# Patient Record
Sex: MALE | Race: BLACK OR AFRICAN AMERICAN | Employment: FULL TIME | URBAN - METROPOLITAN AREA
[De-identification: names, ages, dates, MRNs, and addresses within clinical notes are randomized per-mention and may not be internally consistent; named-entity substitution may affect disease eponyms.]

---

## 2022-02-15 ENCOUNTER — HOSPITAL ENCOUNTER (INPATIENT)
Age: 67
LOS: 3 days | Discharge: HOME | DRG: 812 | End: 2022-02-20
Attending: EMERGENCY MEDICINE | Admitting: INTERNAL MEDICINE
Payer: MEDICARE

## 2022-02-15 DIAGNOSIS — D64.9 SYMPTOMATIC ANEMIA: Primary | ICD-10-CM

## 2022-02-15 DIAGNOSIS — C61 PROSTATE CANCER METASTATIC TO BONE (HCC): ICD-10-CM

## 2022-02-15 DIAGNOSIS — R50.9 FEVER, UNSPECIFIED FEVER CAUSE: ICD-10-CM

## 2022-02-15 DIAGNOSIS — R55 NEAR SYNCOPE: ICD-10-CM

## 2022-02-15 DIAGNOSIS — I47.29 NSVT (NONSUSTAINED VENTRICULAR TACHYCARDIA): ICD-10-CM

## 2022-02-15 DIAGNOSIS — R19.5 LOOSE STOOLS: ICD-10-CM

## 2022-02-15 DIAGNOSIS — C79.51 PROSTATE CANCER METASTATIC TO BONE (HCC): ICD-10-CM

## 2022-02-15 PROBLEM — R11.0 NAUSEA: Status: ACTIVE | Noted: 2022-02-15

## 2022-02-15 PROBLEM — R42 DIZZINESS: Status: ACTIVE | Noted: 2022-02-15

## 2022-02-15 PROBLEM — E87.6 HYPOKALEMIA: Status: ACTIVE | Noted: 2022-02-15

## 2022-02-15 PROBLEM — R63.0 ANOREXIA: Status: ACTIVE | Noted: 2022-02-15

## 2022-02-15 PROBLEM — I95.9 HYPOTENSION: Status: ACTIVE | Noted: 2022-02-15

## 2022-02-15 PROBLEM — E88.09 HYPOALBUMINEMIA: Status: ACTIVE | Noted: 2022-02-15

## 2022-02-15 PROBLEM — R53.1 WEAKNESS: Status: ACTIVE | Noted: 2022-02-15

## 2022-02-15 LAB
ALBUMIN SERPL-MCNC: 2.8 G/DL (ref 3.5–5)
ALBUMIN/GLOB SERPL: 0.7 {RATIO} (ref 1.1–2.2)
ALP SERPL-CCNC: 149 U/L (ref 45–117)
ALT SERPL-CCNC: 27 U/L (ref 12–78)
ANION GAP SERPL CALC-SCNC: 8 MMOL/L (ref 5–15)
AST SERPL-CCNC: 20 U/L (ref 15–37)
BASOPHILS # BLD: 0 K/UL (ref 0–0.1)
BASOPHILS NFR BLD: 0 % (ref 0–1)
BILIRUB SERPL-MCNC: 1.3 MG/DL (ref 0.2–1)
BNP SERPL-MCNC: 3805 PG/ML
BUN SERPL-MCNC: 15 MG/DL (ref 6–20)
BUN/CREAT SERPL: 14 (ref 12–20)
CALCIUM SERPL-MCNC: 6.6 MG/DL (ref 8.5–10.1)
CHLORIDE SERPL-SCNC: 111 MMOL/L (ref 97–108)
CO2 SERPL-SCNC: 22 MMOL/L (ref 21–32)
COMMENT, HOLDF: NORMAL
CREAT SERPL-MCNC: 1.1 MG/DL (ref 0.7–1.3)
DIFFERENTIAL METHOD BLD: ABNORMAL
EOSINOPHIL # BLD: 0 K/UL (ref 0–0.4)
EOSINOPHIL NFR BLD: 0 % (ref 0–7)
ERYTHROCYTE [DISTWIDTH] IN BLOOD BY AUTOMATED COUNT: 21.1 % (ref 11.5–14.5)
GLOBULIN SER CALC-MCNC: 3.9 G/DL (ref 2–4)
GLUCOSE SERPL-MCNC: 174 MG/DL (ref 65–100)
HCT VFR BLD AUTO: 20.8 % (ref 36.6–50.3)
HGB BLD-MCNC: 6.4 G/DL (ref 12.1–17)
IMM GRANULOCYTES # BLD AUTO: 0 K/UL
IMM GRANULOCYTES NFR BLD AUTO: 0 %
LYMPHOCYTES # BLD: 0.7 K/UL (ref 0.8–3.5)
LYMPHOCYTES NFR BLD: 9 % (ref 12–49)
MCH RBC QN AUTO: 28.6 PG (ref 26–34)
MCHC RBC AUTO-ENTMCNC: 30.8 G/DL (ref 30–36.5)
MCV RBC AUTO: 92.9 FL (ref 80–99)
METAMYELOCYTES NFR BLD MANUAL: 7 %
MONOCYTES # BLD: 0.7 K/UL (ref 0–1)
MONOCYTES NFR BLD: 9 % (ref 5–13)
MYELOCYTES NFR BLD MANUAL: 4 %
NEUTS BAND NFR BLD MANUAL: 9 % (ref 0–6)
NEUTS SEG # BLD: 5.6 K/UL (ref 1.8–8)
NEUTS SEG NFR BLD: 62 % (ref 32–75)
NRBC # BLD: 0.69 K/UL (ref 0–0.01)
NRBC BLD-RTO: 8.8 PER 100 WBC
PLATELET # BLD AUTO: 122 K/UL (ref 150–400)
PMV BLD AUTO: 10 FL (ref 8.9–12.9)
POTASSIUM SERPL-SCNC: 3 MMOL/L (ref 3.5–5.1)
PROT SERPL-MCNC: 6.7 G/DL (ref 6.4–8.2)
RBC # BLD AUTO: 2.24 M/UL (ref 4.1–5.7)
RBC MORPH BLD: ABNORMAL
RBC MORPH BLD: ABNORMAL
SAMPLES BEING HELD,HOLD: NORMAL
SODIUM SERPL-SCNC: 141 MMOL/L (ref 136–145)
TROPONIN-HIGH SENSITIVITY: 23 NG/L (ref 0–76)
WBC # BLD AUTO: 7.9 K/UL (ref 4.1–11.1)

## 2022-02-15 PROCEDURE — 86900 BLOOD TYPING SEROLOGIC ABO: CPT

## 2022-02-15 PROCEDURE — 99285 EMERGENCY DEPT VISIT HI MDM: CPT

## 2022-02-15 PROCEDURE — 84484 ASSAY OF TROPONIN QUANT: CPT

## 2022-02-15 PROCEDURE — 83735 ASSAY OF MAGNESIUM: CPT

## 2022-02-15 PROCEDURE — 74011250636 HC RX REV CODE- 250/636: Performed by: NURSE PRACTITIONER

## 2022-02-15 PROCEDURE — 85025 COMPLETE CBC W/AUTO DIFF WBC: CPT

## 2022-02-15 PROCEDURE — 80053 COMPREHEN METABOLIC PANEL: CPT

## 2022-02-15 PROCEDURE — 93005 ELECTROCARDIOGRAM TRACING: CPT

## 2022-02-15 PROCEDURE — 83880 ASSAY OF NATRIURETIC PEPTIDE: CPT

## 2022-02-15 PROCEDURE — G0378 HOSPITAL OBSERVATION PER HR: HCPCS

## 2022-02-15 PROCEDURE — 86923 COMPATIBILITY TEST ELECTRIC: CPT

## 2022-02-15 PROCEDURE — 36415 COLL VENOUS BLD VENIPUNCTURE: CPT

## 2022-02-15 RX ORDER — POTASSIUM CHLORIDE AND SODIUM CHLORIDE 900; 300 MG/100ML; MG/100ML
INJECTION, SOLUTION INTRAVENOUS CONTINUOUS
Status: DISCONTINUED | OUTPATIENT
Start: 2022-02-15 | End: 2022-02-18

## 2022-02-15 RX ORDER — CARVEDILOL 12.5 MG/1
25 TABLET ORAL 2 TIMES DAILY WITH MEALS
Status: DISCONTINUED | OUTPATIENT
Start: 2022-02-16 | End: 2022-02-16

## 2022-02-15 RX ORDER — SODIUM CHLORIDE 9 MG/ML
250 INJECTION, SOLUTION INTRAVENOUS AS NEEDED
Status: DISCONTINUED | OUTPATIENT
Start: 2022-02-15 | End: 2022-02-20 | Stop reason: HOSPADM

## 2022-02-15 RX ORDER — ONDANSETRON 2 MG/ML
4 INJECTION INTRAMUSCULAR; INTRAVENOUS
Status: DISCONTINUED | OUTPATIENT
Start: 2022-02-15 | End: 2022-02-20 | Stop reason: HOSPADM

## 2022-02-15 RX ORDER — ACETAMINOPHEN 325 MG/1
650 TABLET ORAL
Status: DISCONTINUED | OUTPATIENT
Start: 2022-02-15 | End: 2022-02-20 | Stop reason: HOSPADM

## 2022-02-15 RX ORDER — ONDANSETRON 4 MG/1
4 TABLET, ORALLY DISINTEGRATING ORAL
Status: DISCONTINUED | OUTPATIENT
Start: 2022-02-15 | End: 2022-02-20 | Stop reason: HOSPADM

## 2022-02-15 RX ORDER — POTASSIUM CHLORIDE 750 MG/1
40 TABLET, FILM COATED, EXTENDED RELEASE ORAL
Status: COMPLETED | OUTPATIENT
Start: 2022-02-15 | End: 2022-02-16

## 2022-02-15 RX ORDER — POLYETHYLENE GLYCOL 3350 17 G/17G
17 POWDER, FOR SOLUTION ORAL DAILY PRN
Status: DISCONTINUED | OUTPATIENT
Start: 2022-02-15 | End: 2022-02-20 | Stop reason: HOSPADM

## 2022-02-15 RX ORDER — ACETAMINOPHEN 650 MG/1
650 SUPPOSITORY RECTAL
Status: DISCONTINUED | OUTPATIENT
Start: 2022-02-15 | End: 2022-02-20 | Stop reason: HOSPADM

## 2022-02-15 RX ADMIN — SODIUM CHLORIDE 1000 ML: 9 INJECTION, SOLUTION INTRAVENOUS at 22:07

## 2022-02-16 ENCOUNTER — APPOINTMENT (OUTPATIENT)
Dept: CT IMAGING | Age: 67
DRG: 812 | End: 2022-02-16
Attending: INTERNAL MEDICINE
Payer: MEDICARE

## 2022-02-16 ENCOUNTER — APPOINTMENT (OUTPATIENT)
Dept: GENERAL RADIOLOGY | Age: 67
DRG: 812 | End: 2022-02-16
Attending: PHYSICIAN ASSISTANT
Payer: MEDICARE

## 2022-02-16 ENCOUNTER — APPOINTMENT (OUTPATIENT)
Dept: VASCULAR SURGERY | Age: 67
DRG: 812 | End: 2022-02-16
Attending: INTERNAL MEDICINE
Payer: MEDICARE

## 2022-02-16 PROBLEM — D64.81 ANEMIA DUE TO ANTINEOPLASTIC CHEMOTHERAPY: Status: ACTIVE | Noted: 2022-02-15

## 2022-02-16 PROBLEM — T45.1X5A CHEMOTHERAPY-INDUCED NAUSEA: Status: ACTIVE | Noted: 2022-02-15

## 2022-02-16 PROBLEM — R19.5 LOOSE STOOLS: Status: ACTIVE | Noted: 2022-02-16

## 2022-02-16 PROBLEM — T45.1X5A ANEMIA DUE TO ANTINEOPLASTIC CHEMOTHERAPY: Status: ACTIVE | Noted: 2022-02-15

## 2022-02-16 PROBLEM — E53.8 FOLATE DEFICIENCY: Status: ACTIVE | Noted: 2022-02-16

## 2022-02-16 LAB
ALBUMIN SERPL-MCNC: 2.4 G/DL (ref 3.5–5)
ALBUMIN/GLOB SERPL: 0.9 {RATIO} (ref 1.1–2.2)
ALP SERPL-CCNC: 123 U/L (ref 45–117)
ALT SERPL-CCNC: 22 U/L (ref 12–78)
ANION GAP SERPL CALC-SCNC: 9 MMOL/L (ref 5–15)
APPEARANCE UR: ABNORMAL
APPEARANCE UR: ABNORMAL
AST SERPL-CCNC: 17 U/L (ref 15–37)
ATRIAL RATE: 89 BPM
B PERT DNA SPEC QL NAA+PROBE: NOT DETECTED
BACTERIA URNS QL MICRO: NEGATIVE /HPF
BACTERIA URNS QL MICRO: NEGATIVE /HPF
BILIRUB SERPL-MCNC: 1.1 MG/DL (ref 0.2–1)
BILIRUB UR QL CFM: NEGATIVE
BILIRUB UR QL CFM: POSITIVE
BORDETELLA PARAPERTUSSIS PCR, BORPAR: NOT DETECTED
BUN SERPL-MCNC: 14 MG/DL (ref 6–20)
BUN/CREAT SERPL: 18 (ref 12–20)
C PNEUM DNA SPEC QL NAA+PROBE: NOT DETECTED
CALCIUM SERPL-MCNC: 5.5 MG/DL (ref 8.5–10.1)
CALCULATED P AXIS, ECG09: 33 DEGREES
CALCULATED R AXIS, ECG10: 4 DEGREES
CALCULATED T AXIS, ECG11: 9 DEGREES
CHLORIDE SERPL-SCNC: 117 MMOL/L (ref 97–108)
CO2 SERPL-SCNC: 19 MMOL/L (ref 21–32)
COLOR UR: ABNORMAL
COLOR UR: ABNORMAL
COMMENT, HOLDF: NORMAL
CREAT SERPL-MCNC: 0.79 MG/DL (ref 0.7–1.3)
CRP SERPL-MCNC: 13.6 MG/DL (ref 0–0.6)
DIAGNOSIS, 93000: NORMAL
EPITH CASTS URNS QL MICRO: ABNORMAL /LPF
EPITH CASTS URNS QL MICRO: ABNORMAL /LPF
ERYTHROCYTE [DISTWIDTH] IN BLOOD BY AUTOMATED COUNT: 21.1 % (ref 11.5–14.5)
FERRITIN SERPL-MCNC: 1226 NG/ML (ref 26–388)
FLUAV H1 2009 PAND RNA SPEC QL NAA+PROBE: NOT DETECTED
FLUAV H1 RNA SPEC QL NAA+PROBE: NOT DETECTED
FLUAV H3 RNA SPEC QL NAA+PROBE: NOT DETECTED
FLUAV SUBTYP SPEC NAA+PROBE: NOT DETECTED
FLUBV RNA SPEC QL NAA+PROBE: NOT DETECTED
FOLATE SERPL-MCNC: 4.5 NG/ML (ref 5–21)
GLOBULIN SER CALC-MCNC: 2.7 G/DL (ref 2–4)
GLUCOSE BLD STRIP.AUTO-MCNC: 115 MG/DL (ref 65–117)
GLUCOSE BLD STRIP.AUTO-MCNC: 126 MG/DL (ref 65–117)
GLUCOSE BLD STRIP.AUTO-MCNC: 161 MG/DL (ref 65–117)
GLUCOSE BLD STRIP.AUTO-MCNC: 193 MG/DL (ref 65–117)
GLUCOSE SERPL-MCNC: 148 MG/DL (ref 65–100)
GLUCOSE UR STRIP.AUTO-MCNC: NEGATIVE MG/DL
GLUCOSE UR STRIP.AUTO-MCNC: NEGATIVE MG/DL
GRAN CASTS URNS QL MICRO: ABNORMAL /LPF
GRAN CASTS URNS QL MICRO: ABNORMAL /LPF
HADV DNA SPEC QL NAA+PROBE: NOT DETECTED
HAPTOGLOB SERPL-MCNC: 149 MG/DL (ref 30–200)
HCOV 229E RNA SPEC QL NAA+PROBE: NOT DETECTED
HCOV HKU1 RNA SPEC QL NAA+PROBE: NOT DETECTED
HCOV NL63 RNA SPEC QL NAA+PROBE: NOT DETECTED
HCOV OC43 RNA SPEC QL NAA+PROBE: NOT DETECTED
HCT VFR BLD AUTO: 16.9 % (ref 36.6–50.3)
HCT VFR BLD AUTO: 23.5 % (ref 36.6–50.3)
HCT VFR BLD AUTO: 24.9 % (ref 36.6–50.3)
HEMOCCULT STL QL: NEGATIVE
HGB BLD-MCNC: 5.1 G/DL (ref 12.1–17)
HGB BLD-MCNC: 7.4 G/DL (ref 12.1–17)
HGB BLD-MCNC: 7.7 G/DL (ref 12.1–17)
HGB UR QL STRIP: NEGATIVE
HGB UR QL STRIP: NEGATIVE
HISTORY CHECKED?,CKHIST: NORMAL
HISTORY CHECKED?,CKHIST: NORMAL
HMPV RNA SPEC QL NAA+PROBE: NOT DETECTED
HPIV1 RNA SPEC QL NAA+PROBE: NOT DETECTED
HPIV2 RNA SPEC QL NAA+PROBE: NOT DETECTED
HPIV3 RNA SPEC QL NAA+PROBE: NOT DETECTED
HPIV4 RNA SPEC QL NAA+PROBE: NOT DETECTED
HYALINE CASTS URNS QL MICRO: ABNORMAL /LPF (ref 0–5)
HYALINE CASTS URNS QL MICRO: ABNORMAL /LPF (ref 0–5)
IRON SATN MFR SERPL: 26 % (ref 20–50)
IRON SERPL-MCNC: 48 UG/DL (ref 35–150)
KETONES UR QL STRIP.AUTO: ABNORMAL MG/DL
KETONES UR QL STRIP.AUTO: NEGATIVE MG/DL
LACTATE SERPL-SCNC: 1.9 MMOL/L (ref 0.4–2)
LACTATE SERPL-SCNC: 2.5 MMOL/L (ref 0.4–2)
LDH SERPL L TO P-CCNC: 577 U/L (ref 85–241)
LEUKOCYTE ESTERASE UR QL STRIP.AUTO: ABNORMAL
LEUKOCYTE ESTERASE UR QL STRIP.AUTO: ABNORMAL
M PNEUMO DNA SPEC QL NAA+PROBE: NOT DETECTED
MAGNESIUM SERPL-MCNC: 1.7 MG/DL (ref 1.6–2.4)
MAGNESIUM SERPL-MCNC: 2 MG/DL (ref 1.6–2.4)
MCH RBC QN AUTO: 28.2 PG (ref 26–34)
MCHC RBC AUTO-ENTMCNC: 30.2 G/DL (ref 30–36.5)
MCV RBC AUTO: 93.4 FL (ref 80–99)
MUCOUS THREADS URNS QL MICRO: ABNORMAL /LPF
NITRITE UR QL STRIP.AUTO: NEGATIVE
NITRITE UR QL STRIP.AUTO: NEGATIVE
NRBC # BLD: 0.5 K/UL (ref 0–0.01)
NRBC BLD-RTO: 8.1 PER 100 WBC
P-R INTERVAL, ECG05: 128 MS
PH UR STRIP: 5.5 [PH] (ref 5–8)
PH UR STRIP: 5.5 [PH] (ref 5–8)
PLATELET # BLD AUTO: 104 K/UL (ref 150–400)
PMV BLD AUTO: 11.3 FL (ref 8.9–12.9)
POTASSIUM SERPL-SCNC: 2.8 MMOL/L (ref 3.5–5.1)
POTASSIUM SERPL-SCNC: 3.6 MMOL/L (ref 3.5–5.1)
PROCALCITONIN SERPL-MCNC: 0.59 NG/ML
PROT SERPL-MCNC: 5.1 G/DL (ref 6.4–8.2)
PROT UR STRIP-MCNC: 100 MG/DL
PROT UR STRIP-MCNC: 100 MG/DL
Q-T INTERVAL, ECG07: 428 MS
QRS DURATION, ECG06: 88 MS
QTC CALCULATION (BEZET), ECG08: 520 MS
RBC # BLD AUTO: 1.81 M/UL (ref 4.1–5.7)
RBC #/AREA URNS HPF: ABNORMAL /HPF (ref 0–5)
RBC #/AREA URNS HPF: ABNORMAL /HPF (ref 0–5)
RETICS # AUTO: 0.05 M/UL (ref 0.03–0.1)
RETICS/RBC NFR AUTO: 3 % (ref 0.7–2.1)
RSV RNA SPEC QL NAA+PROBE: NOT DETECTED
RV+EV RNA SPEC QL NAA+PROBE: NOT DETECTED
SAMPLES BEING HELD,HOLD: NORMAL
SARS-COV-2 PCR, COVPCR: NOT DETECTED
SERVICE CMNT-IMP: ABNORMAL
SERVICE CMNT-IMP: NORMAL
SODIUM SERPL-SCNC: 145 MMOL/L (ref 136–145)
SP GR UR REFRACTOMETRY: 1.02 (ref 1–1.03)
SP GR UR REFRACTOMETRY: 1.02 (ref 1–1.03)
TIBC SERPL-MCNC: 185 UG/DL (ref 250–450)
UA: UC IF INDICATED,UAUC: ABNORMAL
UR CULT HOLD, URHOLD: NORMAL
URATE SERPL-MCNC: 13.1 MG/DL (ref 3.5–7.2)
UROBILINOGEN UR QL STRIP.AUTO: 1 EU/DL (ref 0.2–1)
UROBILINOGEN UR QL STRIP.AUTO: 2 EU/DL (ref 0.2–1)
VENTRICULAR RATE, ECG03: 89 BPM
VIT B12 SERPL-MCNC: 1883 PG/ML (ref 193–986)
WBC # BLD AUTO: 6.2 K/UL (ref 4.1–11.1)
WBC CASTS URNS QL MICRO: ABNORMAL /LPF
WBC URNS QL MICRO: ABNORMAL /HPF (ref 0–4)
WBC URNS QL MICRO: ABNORMAL /HPF (ref 0–4)

## 2022-02-16 PROCEDURE — 74011250637 HC RX REV CODE- 250/637: Performed by: INTERNAL MEDICINE

## 2022-02-16 PROCEDURE — 96376 TX/PRO/DX INJ SAME DRUG ADON: CPT

## 2022-02-16 PROCEDURE — 74011250637 HC RX REV CODE- 250/637: Performed by: NURSE PRACTITIONER

## 2022-02-16 PROCEDURE — 74011250636 HC RX REV CODE- 250/636: Performed by: INTERNAL MEDICINE

## 2022-02-16 PROCEDURE — 85018 HEMOGLOBIN: CPT

## 2022-02-16 PROCEDURE — 87177 OVA AND PARASITES SMEARS: CPT

## 2022-02-16 PROCEDURE — 82272 OCCULT BLD FECES 1-3 TESTS: CPT

## 2022-02-16 PROCEDURE — 84550 ASSAY OF BLOOD/URIC ACID: CPT

## 2022-02-16 PROCEDURE — 97161 PT EVAL LOW COMPLEX 20 MIN: CPT

## 2022-02-16 PROCEDURE — 80053 COMPREHEN METABOLIC PANEL: CPT

## 2022-02-16 PROCEDURE — 82607 VITAMIN B-12: CPT

## 2022-02-16 PROCEDURE — 96366 THER/PROPH/DIAG IV INF ADDON: CPT

## 2022-02-16 PROCEDURE — 2709999900 HC NON-CHARGEABLE SUPPLY

## 2022-02-16 PROCEDURE — 82728 ASSAY OF FERRITIN: CPT

## 2022-02-16 PROCEDURE — P9016 RBC LEUKOCYTES REDUCED: HCPCS

## 2022-02-16 PROCEDURE — 87040 BLOOD CULTURE FOR BACTERIA: CPT

## 2022-02-16 PROCEDURE — 83540 ASSAY OF IRON: CPT

## 2022-02-16 PROCEDURE — 84132 ASSAY OF SERUM POTASSIUM: CPT

## 2022-02-16 PROCEDURE — G0378 HOSPITAL OBSERVATION PER HR: HCPCS

## 2022-02-16 PROCEDURE — 87324 CLOSTRIDIUM AG IA: CPT

## 2022-02-16 PROCEDURE — 97535 SELF CARE MNGMENT TRAINING: CPT

## 2022-02-16 PROCEDURE — 84145 PROCALCITONIN (PCT): CPT

## 2022-02-16 PROCEDURE — 82746 ASSAY OF FOLIC ACID SERUM: CPT

## 2022-02-16 PROCEDURE — 83615 LACTATE (LD) (LDH) ENZYME: CPT

## 2022-02-16 PROCEDURE — 73630 X-RAY EXAM OF FOOT: CPT

## 2022-02-16 PROCEDURE — 74011000250 HC RX REV CODE- 250: Performed by: INTERNAL MEDICINE

## 2022-02-16 PROCEDURE — 36430 TRANSFUSION BLD/BLD COMPNT: CPT

## 2022-02-16 PROCEDURE — 96375 TX/PRO/DX INJ NEW DRUG ADDON: CPT

## 2022-02-16 PROCEDURE — 82962 GLUCOSE BLOOD TEST: CPT

## 2022-02-16 PROCEDURE — 83010 ASSAY OF HAPTOGLOBIN QUANT: CPT

## 2022-02-16 PROCEDURE — 0202U NFCT DS 22 TRGT SARS-COV-2: CPT

## 2022-02-16 PROCEDURE — 85027 COMPLETE CBC AUTOMATED: CPT

## 2022-02-16 PROCEDURE — 83735 ASSAY OF MAGNESIUM: CPT

## 2022-02-16 PROCEDURE — 51798 US URINE CAPACITY MEASURE: CPT

## 2022-02-16 PROCEDURE — 97530 THERAPEUTIC ACTIVITIES: CPT

## 2022-02-16 PROCEDURE — 81001 URINALYSIS AUTO W/SCOPE: CPT

## 2022-02-16 PROCEDURE — 83605 ASSAY OF LACTIC ACID: CPT

## 2022-02-16 PROCEDURE — 74176 CT ABD & PELVIS W/O CONTRAST: CPT

## 2022-02-16 PROCEDURE — 85045 AUTOMATED RETICULOCYTE COUNT: CPT

## 2022-02-16 PROCEDURE — 36415 COLL VENOUS BLD VENIPUNCTURE: CPT

## 2022-02-16 PROCEDURE — 96365 THER/PROPH/DIAG IV INF INIT: CPT

## 2022-02-16 PROCEDURE — 97116 GAIT TRAINING THERAPY: CPT

## 2022-02-16 PROCEDURE — 93970 EXTREMITY STUDY: CPT

## 2022-02-16 PROCEDURE — 87506 IADNA-DNA/RNA PROBE TQ 6-11: CPT

## 2022-02-16 PROCEDURE — 96368 THER/DIAG CONCURRENT INF: CPT

## 2022-02-16 PROCEDURE — 97165 OT EVAL LOW COMPLEX 30 MIN: CPT

## 2022-02-16 PROCEDURE — 77030027138 HC INCENT SPIROMETER -A

## 2022-02-16 PROCEDURE — 86140 C-REACTIVE PROTEIN: CPT

## 2022-02-16 PROCEDURE — 99223 1ST HOSP IP/OBS HIGH 75: CPT | Performed by: INTERNAL MEDICINE

## 2022-02-16 RX ORDER — MEGESTROL ACETATE 40 MG/ML
400 SUSPENSION ORAL DAILY
COMMUNITY

## 2022-02-16 RX ORDER — MAGNESIUM SULFATE 100 %
4 CRYSTALS MISCELLANEOUS AS NEEDED
Status: DISCONTINUED | OUTPATIENT
Start: 2022-02-16 | End: 2022-02-20 | Stop reason: HOSPADM

## 2022-02-16 RX ORDER — PREDNISONE 1 MG/1
5 TABLET ORAL DAILY
COMMUNITY

## 2022-02-16 RX ORDER — INSULIN LISPRO 100 [IU]/ML
INJECTION, SOLUTION INTRAVENOUS; SUBCUTANEOUS
Status: DISCONTINUED | OUTPATIENT
Start: 2022-02-16 | End: 2022-02-20 | Stop reason: HOSPADM

## 2022-02-16 RX ORDER — IBUPROFEN 600 MG/1
600 TABLET ORAL 2 TIMES DAILY
COMMUNITY

## 2022-02-16 RX ORDER — MEGESTROL ACETATE 40 MG/ML
400 SUSPENSION ORAL DAILY
Status: DISCONTINUED | OUTPATIENT
Start: 2022-02-17 | End: 2022-02-20 | Stop reason: HOSPADM

## 2022-02-16 RX ORDER — LOPERAMIDE HYDROCHLORIDE 2 MG/1
2 CAPSULE ORAL
Status: DISCONTINUED | OUTPATIENT
Start: 2022-02-16 | End: 2022-02-20 | Stop reason: HOSPADM

## 2022-02-16 RX ORDER — PREDNISONE 5 MG/1
5 TABLET ORAL DAILY
Status: DISCONTINUED | OUTPATIENT
Start: 2022-02-17 | End: 2022-02-18

## 2022-02-16 RX ORDER — ONDANSETRON 4 MG/1
4 TABLET, FILM COATED ORAL
COMMUNITY

## 2022-02-16 RX ORDER — METRONIDAZOLE 500 MG/100ML
500 INJECTION, SOLUTION INTRAVENOUS EVERY 12 HOURS
Status: DISCONTINUED | OUTPATIENT
Start: 2022-02-16 | End: 2022-02-17

## 2022-02-16 RX ORDER — POTASSIUM CHLORIDE 750 MG/1
40 TABLET, FILM COATED, EXTENDED RELEASE ORAL
Status: COMPLETED | OUTPATIENT
Start: 2022-02-16 | End: 2022-02-16

## 2022-02-16 RX ORDER — SODIUM CHLORIDE 9 MG/ML
250 INJECTION, SOLUTION INTRAVENOUS AS NEEDED
Status: DISCONTINUED | OUTPATIENT
Start: 2022-02-16 | End: 2022-02-20 | Stop reason: HOSPADM

## 2022-02-16 RX ORDER — MULTIVITAMIN
1 TABLET ORAL DAILY
COMMUNITY

## 2022-02-16 RX ORDER — DEXTROSE 50 % IN WATER (D50W) INTRAVENOUS SYRINGE
12.5-25 AS NEEDED
Status: DISCONTINUED | OUTPATIENT
Start: 2022-02-16 | End: 2022-02-20 | Stop reason: HOSPADM

## 2022-02-16 RX ORDER — FOLIC ACID 1 MG/1
1 TABLET ORAL DAILY
Status: DISCONTINUED | OUTPATIENT
Start: 2022-02-16 | End: 2022-02-20 | Stop reason: HOSPADM

## 2022-02-16 RX ORDER — MAGNESIUM SULFATE 1 G/100ML
1 INJECTION INTRAVENOUS
Status: DISPENSED | OUTPATIENT
Start: 2022-02-16 | End: 2022-02-16

## 2022-02-16 RX ORDER — CALCIUM GLUCONATE 20 MG/ML
1 INJECTION, SOLUTION INTRAVENOUS ONCE
Status: COMPLETED | OUTPATIENT
Start: 2022-02-16 | End: 2022-02-16

## 2022-02-16 RX ADMIN — POTASSIUM CHLORIDE AND SODIUM CHLORIDE 1000 ML: 900; 300 INJECTION, SOLUTION INTRAVENOUS at 03:55

## 2022-02-16 RX ADMIN — METRONIDAZOLE 500 MG: 500 INJECTION, SOLUTION INTRAVENOUS at 18:39

## 2022-02-16 RX ADMIN — CEFTRIAXONE 1 G: 1 INJECTION, POWDER, FOR SOLUTION INTRAMUSCULAR; INTRAVENOUS at 18:39

## 2022-02-16 RX ADMIN — POTASSIUM CHLORIDE 40 MEQ: 750 TABLET, FILM COATED, EXTENDED RELEASE ORAL at 10:42

## 2022-02-16 RX ADMIN — POTASSIUM CHLORIDE 40 MEQ: 750 TABLET, EXTENDED RELEASE ORAL at 00:44

## 2022-02-16 RX ADMIN — CALCIUM GLUCONATE 1000 MG: 20 INJECTION, SOLUTION INTRAVENOUS at 01:46

## 2022-02-16 RX ADMIN — POTASSIUM CHLORIDE AND SODIUM CHLORIDE: 900; 300 INJECTION, SOLUTION INTRAVENOUS at 11:43

## 2022-02-16 RX ADMIN — MAGNESIUM SULFATE HEPTAHYDRATE 1 G: 1 INJECTION, SOLUTION INTRAVENOUS at 01:49

## 2022-02-16 RX ADMIN — ONDANSETRON 4 MG: 4 TABLET, ORALLY DISINTEGRATING ORAL at 21:40

## 2022-02-16 RX ADMIN — FOLIC ACID 1 MG: 1 TABLET ORAL at 15:08

## 2022-02-16 NOTE — PROGRESS NOTES
2/16/2022  11:59 AM  Medicare Outpatient Observation Notice (MOON)/ [place] Outpatient Observation Notice (Sloan Cantu) provided to patient/representative with verbal explanation of the notice. Time allotted for questions regarding the notice. Patient /representative provided a completed copy of the MOON/VOON notice. Copy placed on bedside chart.   Ty Donald, MARÍA ELENA

## 2022-02-16 NOTE — PROGRESS NOTES
Physical Therapy Note:  PT rders received and appreciated, chart reviewed, noted hgb of 5.1 this AM and pt now s/p 2 units of blood, awaiting new draw. Pt also with orders for LE dopplers to r/o DVT.  Will defer and follow up after completion of dopplers prior to initiation PT eval.   Alexander Chavez, PT

## 2022-02-16 NOTE — H&P
[facility]    Hospitalist Admission Note      NAME:  Som Ho   :   1955   MRN:  [id number]     PCP:  Sharyn Mayorga MD     Date/Time of service:  2/15/2022 11:30 PM          Subjective:     CHIEF COMPLAINT: dizzy     HISTORY OF PRESENT ILLNESS:     Mr. Marc Church is a 78 y.o.  male who presented to the Emergency Department complaining of dizziness. He just completed his 2nd chemo for worsening prostate cancer 2 weeks ago in [place], before [place] in [place]. ER finds anemia. We will admit him for observation. Past Medical History:   Diagnosis Date    GERD (gastroesophageal reflux disease)     HTN (hypertension)     Prostate cancer metastatic to bone ([address]     Urinary incontinence         No past surgical history on file. Social History     Tobacco Use    Smoking status: Not on file    Smokeless tobacco: Not on file   Substance Use Topics    Alcohol use: Not on file        No family history on file.    Family hx cannot be fully assessed, since the patient cannot provide information    No Known Allergies     Prior to Admission medications    Not on File       Review of Systems:  (bold if positive, if negative)    Gen:  Eyes:  ENT:  CVS:  Pulm:  GI:  :  MS:  Skin:  Psych:  Endo:  Hem:  Renal:  Neuro:        Objective:      VITALS:    Vital signs reviewed; most recent are:    Visit Vitals  /68 (BP Patient Position: Standing)   Pulse (!) 114   Temp 98.4 °F (36.9 °C)   Resp 24   Ht 5' 11\" (1.803 m)   Wt 86.2 kg (190 lb)   SpO2 100%   BMI 26.50 kg/m²     SpO2 Readings from Last 6 Encounters:   02/15/22 100%        No intake or output data in the 24 hours ending 02/15/22 2330     Exam:     Physical Exam:    Gen:  Well-developed, well-nourished, in no acute distress  HEENT:  Pink conjunctivae, PERRL, hearing intact to voice, moist mucous membranes  Neck:  Supple, without masses, thyroid non-tender  Resp:  No accessory muscle use, clear breath sounds without wheezes rales or rhonchi  Card:  No murmurs, tachycardic S1, S2 without thrills, bruits or peripheral edema  Abd:  Soft, non-tender, non-distended, normoactive bowel sounds are present, no mass  Lymph:  No cervical or inguinal adenopathy  Musc:  No cyanosis or clubbing  Skin:  No rashes or ulcers, skin turgor is good  Neuro:  Cranial nerves are grossly intact, general motor weakness, follows commands   Psych:  Good insight, oriented to person, place and time, alert     Labs:    Recent Labs     02/15/22  2200   WBC 7.9   HGB 6.4*   HCT 20.8*   *     Recent Labs     02/15/22  2200      K 3.0*   *   CO2 22   *   BUN 15   CREA 1.10   CA 6.6*   ALB 2.8*   TBILI 1.3*   ALT 27     No results found for: GLUCPOC  No results for input(s): PH, PCO2, PO2, HCO3, FIO2 in the last 72 hours. No results for input(s): INR, INREXT in the last 72 hours. All Micro Results     Procedure Component Value Units Date/Time    RESPIRATORY VIRUS PANEL W/COVID-19, PCR [[id number]]     Order Status: Sent Specimen: NASOPHARYNGEAL SWAB     URINE CULTURE HOLD SAMPLE [[id number]]     Order Status: Sent Specimen: Urine           I have reviewed previous records       Assessment and Plan:      Anemia / Thrombocytopenia - POA, presumed due to chemo. Orthostatic dizziness / Hx HTN (hypertension) - POA due to IVVD, anemia and overmedication. Hydrate and hold HCTZ, valsartan and norvasc. Continue coreg. Hypokalemia - Related to IVVD and anorexia. Replete and monitor. Anorexia / Nausea / GERD (gastroesophageal reflux disease) / Hypoalbuminemia - POA due to chemo  Check for infection, procalcitonin    Prostate cancer metastatic to bone - Followed by oncology in [address] [place] [address]    Urinary incontinence - Followed by urology    Diabetes - POA, usually on metformin. Until eating I will hold off SSI per protocol. Hold off home metformin. Check A1c.     Weakness - POA due to anemia, IVVD, chemo, cancer, deconditioning, etc. Fall precautions. PT POT eval    Telemetry reviewed:   normal sinus rhythm    Risk of deterioration: high      Total time spent with patient: 48 Minutes I personally reviewed chart, notes, data and current medications in the medical record. I have personally examined and treated the patient at bedside during this period.                  Care Plan discussed with: Patient, Nursing Staff and >50% of time spent in counseling and coordination of care    Discussed:  Care Plan       ___________________________________________________    Attending Physician: Adrian Holt MD

## 2022-02-16 NOTE — PROGRESS NOTES
Problem: Mobility Impaired (Adult and Pediatric)  Goal: *Acute Goals and Plan of Care (Insert Text)  Description: FUNCTIONAL STATUS PRIOR TO ADMISSION: Patient was modified independent using a single point cane for functional mobility. HOME SUPPORT PRIOR TO ADMISSION: The patient lived with single point cane but did not require assist.    Physical Therapy Goals  Initiated 2/16/2022  1. Patient will move from supine to sit and sit to supine , scoot up and down, and roll side to side in bed with independence within 7 day(s). 2.  Patient will transfer from bed to chair and chair to bed with modified independence using the least restrictive device within 7 day(s). 3.  Patient will perform sit to stand with modified independence within 7 day(s). 4.  Patient will ambulate with modified independence for 200 feet with the least restrictive device within 7 day(s). 5.  Patient will ascend/descend 4 stairs with  handrail(s) with modified independence within 7 day(s). Outcome: Progressing Towards Goal   PHYSICAL THERAPY EVALUATION  Patient: Itz Engel (1 y.o. male)  Date: 2/16/2022  Primary Diagnosis: Anemia [D64.9]        Precautions: fall       ASSESSMENT  Based on the objective data described below, the patient presents with difficulty with ambulation. Communicated with nurse DVT result came back negative cleared for therapy. Patient supine on bed when received daughter at bedside agreed with all goals set for the patient. Still having pain on the right foot for gout flare up. Rolled on the edge of bed CGA, supine to sit CGA, sit to stand CGA, ambulate with single point cane in the room and around the bed patient cane too short and noted patient unsteady holding on the IV pole during ambulation. Provided with rolling walker and patient able to ambulate much steadier and safer. Offered to be OOB to chair as tolerated patient declined just want to go back to bed and have a nap.  Assisted supine on bed and activated bed alarm notified nurse who agreed to monitor patient. Educate and instructed patient to continue active range of motion exercise on both legs while up on chair or on bed multiple times. Recommend patient to be up on the chair at least 3 times a day every meal times as tolerated. Communicated with nurse who agreed to monitor patient. Current Level of Function Impacting Discharge (mobility/balance): CGA with transfers and ambulation using a rolling walker     Functional Outcome Measure: The patient scored 55/100 on the barthel index outcome measure. Other factors to consider for discharge: fall     Patient will benefit from skilled therapy intervention to address the above noted impairments. PLAN :  Recommendations and Planned Interventions: bed mobility training, transfer training, gait training, therapeutic exercises, neuromuscular re-education, orthotic/prosthetic training, patient and family training/education, and therapeutic activities      Frequency/Duration: Patient will be followed by physical therapy:  5 times a week to address goals. Recommendation for discharge: (in order for the patient to meet his/her long term goals)  No skilled physical therapy/ follow up rehabilitation needs identified at this time. This discharge recommendation:  Has been made in collaboration with the attending provider and/or case management    IF patient discharges home will need the following DME: rolling walker and to be determined (TBD)         SUBJECTIVE:   Patient stated ok.     OBJECTIVE DATA SUMMARY:   HISTORY:    Past Medical History:   Diagnosis Date    DM (diabetes mellitus) ([facility]     GERD (gastroesophageal reflux disease)     HTN (hypertension)     Prostate cancer metastatic to bone [facility])     Urinary incontinence    No past surgical history on file.     Personal factors and/or comorbidities impacting plan of care:     Home Situation  Home Environment: Private residence  # Steps to Enter: 1 (one step down)  Rails to Enter: No  One/Two Story Residence: Two story  Living Alone: No  Support Systems: Spouse/Significant Other (pt livesd in pvt residece w/ spouse and 2 sons, at baseline pt is ambulatory, iADLS, drives)  Patient Expects to be Discharged to[de-identified] Home with outpatient services    EXAMINATION/PRESENTATION/DECISION MAKING:   Critical Behavior:  Neurologic State: Alert  Orientation Level: Oriented X4  Cognition: Appropriate decision making,Appropriate for age attention/concentration,Appropriate safety awareness,Follows commands  Safety/Judgement: Awareness of environment,Fall prevention,Good awareness of safety precautions,Home safety,Insight into deficits  Hearing: Auditory  Auditory Impairment: Hard of hearing, bilateral      Range Of Motion:  AROM: Generally decreased, functional                       Strength:    Strength: Generally decreased, functional                    Tone & Sensation:   Tone: Normal                              Coordination:  Coordination: Within functional limits  Vision:   Corrective Lenses: Glasses  Functional Mobility:  Bed Mobility:  Rolling: Modified independent  Supine to Sit: Modified independent  Sit to Supine: Modified independent  Scooting: Modified independent  Transfers:  Sit to Stand: Contact guard assistance  Stand to Sit: Contact guard assistance  Stand Pivot Transfers: Contact guard assistance     Bed to Chair: Contact guard assistance              Balance:   Sitting: Intact  Standing: Impaired; With support (with SPC)  Standing - Static: Good;Constant support  Standing - Dynamic : Good;Fair;Constant support  Ambulation/Gait Training:  Distance (ft): 25 Feet (ft)  Assistive Device: Cane, straight;Walker, rolling;Gait belt  Ambulation - Level of Assistance: Contact guard assistance     Gait Description (WDL): Exceptions to WDL  Gait Abnormalities: Antalgic        Base of Support: Widened     Speed/Kaitlin: Fluctuations; Slow  Step Length: Right shortened;Left shortened                   Therapeutic Exercises:   Educate and instructed patient to continue active range of motion exercise on both legs while up on chair or on bed multiple times. Recommend patient to be up on the chair at least 3 times a day every meal times as tolerated. Functional Measure:  Barthel Index:    Bathin  Bladder: 10  Bowels: 5  Groomin  Dressin  Feeding: 10  Mobility: 0  Stairs: 0  Toilet Use: 5  Transfer (Bed to Chair and Back): 10  Total: 50/100       The Barthel ADL Index: Guidelines  1. The index should be used as a record of what a patient does, not as a record of what a patient could do. 2. The main aim is to establish degree of independence from any help, physical or verbal, however minor and for whatever reason. 3. The need for supervision renders the patient not independent. 4. A patient's performance should be established using the best available evidence. Asking the patient, friends/relatives and nurses are the usual sources, but direct observation and common sense are also important. However direct testing is not needed. 5. Usually the patient's performance over the preceding 24-48 hours is important, but occasionally longer periods will be relevant. 6. Middle categories imply that the patient supplies over 50 per cent of the effort. 7. Use of aids to be independent is allowed. Score Interpretation (from [facility])    Independent   60-79 Minimally independent   40-59 Partially dependent   20-39 Very dependent   <20 Totally dependent     -Chinmay Villanueva., Barthel, D.W. (1965). Functional evaluation: the Barthel Index. [facility] ([address] (). The Barthel activities of daily living index: self-reporting versus actual performance in the old (> or = 75 years). Journal of [address] [place] [address] 45(7), [facility], ZIGGY, Stephen Reyes., Shandra Gamez. ().  Measuring the change in disability after inpatient rehabilitation; comparison of the responsiveness of the Barthel Index and Functional [place] Measure. Journal of Neurology, Neurosurgery, and Psychiatry, 66(4), 660-882. HETAL Peace, VINCE Calderón, & Lewis Heller M.A. (2004) Assessment of post-stroke quality of life in cost-effectiveness studies: The usefulness of the Barthel Index and the EuroQoL-5D. Quality of Life Research, 15, 222-19           Physical Therapy Evaluation Charge Determination   History Examination Presentation Decision-Making   LOW Complexity : Zero comorbidities / personal factors that will impact the outcome / POC LOW Complexity : 1-2 Standardized tests and measures addressing body structure, function, activity limitation and / or participation in recreation  LOW Complexity : Stable, uncomplicated  Other outcome measures barthel  LOW       Based on the above components, the patient evaluation is determined to be of the following complexity level: LOW     Pain Ratin/10    Activity Tolerance:   Good    After treatment patient left in no apparent distress:   Supine in bed, Heels elevated for pressure relief, Call bell within reach, Bed / chair alarm activated, Caregiver / family present, and Side rails x 3    COMMUNICATION/EDUCATION:   The patients plan of care was discussed with: Occupational therapist and Registered nurse. Fall prevention education was provided and the patient/caregiver indicated understanding. Thank you for this referral.  Silvino Archuleta PT,WCC.    Time Calculation: 28 mins

## 2022-02-16 NOTE — PROGRESS NOTES
Xray of right 1st MTP consistent with gouty arthropathy and chronic osseous changes. Continue with current plan for gouty arthropathy. Orthopedics will formally sign off. Please reconsult for further concerns. F/U per consult note.     Neyda Amaya PA-C  [facility]  [facility]

## 2022-02-16 NOTE — PROGRESS NOTES
1120  Patient stated that he's not taking anymore BP meds at home. His RU=503/58mmHg and HR=83. Also, they spoke with the NP from pt's Oncology in [place] asking for CT scan to rule out what's causing his drop of Hemoglobin. He's only taking: caltrate, megestrol, Zofran, and prednione 1mg at home. PTA home med list updated    Dr. Tenzin Kelley notified and order to hold coreg    [phone]  Dr. Tenzin Kelley ordered Stool occult blood and UA C&S      1200  Instructed patient and daughter that urine sample needs to be collected and verbalized understanding        1515  Sepsis score alert 7.3;  Spoke with daughter and stated he took some ensures and just started to have diarrhea, they spoke with Oncologist from [place] and they requested a GI consult as well    Spoke with Dr. Tenzin Kelley and ordered [facility] paired, lactic acid, GI consult and nutrition consult    And she will order CT abd and pelvis    Read back and verified. 1815  tele called they said that his HR went up to 150s- Sinus tach then back to 90s- it happened when he was stood up, walked a bit and transferred from bed to stretcher    Dr. Tenzin Kelley notified. Continue to monitor per MD      1905  Bedside and Verbal shift change report given to Merlyn NGUYEN (oncoming nurse) by Beatriz Yang RN (offgoing nurse). Report included the following information SBAR, Kardex, Intake/Output, MAR and Recent Results.

## 2022-02-16 NOTE — PROGRESS NOTES
Patient received from ED    Patient stated Blood Transfusion completed in ED. Blood Transfusion not present when patient came up to floor.

## 2022-02-16 NOTE — PROGRESS NOTES
Occupational Therapy:  02/16/22    Orders received and appreciated, chart reviewed, noted hgb of 5.1 this AM and pt now s/p 2 units of blood, awaiting new draw. Pt also with orders for LE dopplers to r/o DVT.  Will defer and follow up after completion of dopplers prior to initiation OT eval.     Thank you,  Emma Gilbert, OTR/L

## 2022-02-16 NOTE — CONSULTS
New Urology Consult Note    Patient: Merline Michael MRN: [id number]  SSN: [id number]    YOB: 1955  Age: 78 y.o. Sex: male            Assessment:     Merline Michael is a 78 y.o. male with HTN and metastatic prostate CA with c/o of generalized weakness, SOB, dizziness. He denies urinary complaints, reports he is voiding normally. Urine has a blood tinged, milky appearance     hgb 6.4 on arrival and dipped to 5.1 yesterday requiring a blood transfusion overnight. Renal function is normal   UA is grossly negative for infection     Recommendations:     1. Continue supportive treatment, with goals to stabilize and optimize- for him to return home  2. Bladder scan to assess for retention - place catheter for volume greater than 350ml  3. No acute Urologic intervention   4. F/u with his primary Oncologist/Urologist in [place]     He tells me his provider from [place] has requested imaging - it's reasonable to fulfill that request during this admission. Thank you for this consult. Please contact [facility] with any further questions/concerns. Akbar Davis, DAYANAP-C [phone]    History of Present Illness:     Reason for Consult:  Prostate CA     Merline Michael is seen in consultation for reasons noted above at the request of Precious Escobar DO. This is a 78 y.o. male with a history of HTN, GERD, and metastatic prostate CA. He is visiting [place], he resides in [place] where he is receiving chemo (last treatment 2 weeks ago) for his prostate CA. He presented to the ED 1 day ago for c/o dizziness, SOB, and generalized weakness.      ED findings   Hgb 6.4   Hypokalemia             Subjective     Past Medical History  Past Medical History:   Diagnosis Date    DM (diabetes mellitus) ([facility] [address]     GERD (gastroesophageal reflux disease)     HTN (hypertension)     Prostate cancer metastatic to bone (HCC)     Urinary incontinence        Past Surgical History:   No past surgical history on file. Medication:  Current Facility-Administered Medications   Medication Dose Route Frequency Provider Last Rate Last Admin    0.9% sodium chloride infusion 250 mL  250 mL IntraVENous PRN Harris Chisholm MD        [START ON 2/17/2022] megestroL (MEGACE) 400 mg/10 mL (40 mg/mL) oral suspension 400 mg  400 mg Oral DAILY Rob Claudy, DO        [START ON 2/17/2022] predniSONE (DELTASONE) tablet 5 mg  5 mg Oral DAILY Donnell, Lashaun, DO        folic acid (FOLVITE) tablet 1 mg  1 mg Oral DAILY Donnell, Lashaun, DO        0.9% sodium chloride infusion 250 mL  250 mL IntraVENous PRN Dong Dougherty NP        0.9% sodium chloride with KCl 40 mEq/L infusion   IntraVENous CONTINUOUS Harris Chisholm  mL/hr at 02/16/22 1143 New Bag at 02/16/22 1143    acetaminophen (TYLENOL) tablet 650 mg  650 mg Oral Q6H PRN Harris Chisholm MD        Or    acetaminophen (TYLENOL) suppository 650 mg  650 mg Rectal Q6H PRN Harris Chisholm MD        polyethylene glycol (MIRALAX) packet 17 g  17 g Oral DAILY PRN Harris Chisholm MD        ondansetron (ZOFRAN ODT) tablet 4 mg  4 mg Oral Q8H PRN Harris Chisholm MD        Or    ondansetron [facility]) injection 4 mg  4 mg IntraVENous Q6H PRN Harris Chisholm MD           Allergies:  No Known Allergies    Social History:  Social History     Tobacco Use    Smoking status: Not on file    Smokeless tobacco: Not on file   Substance Use Topics    Alcohol use: Not on file    Drug use: Not on file       Family History  No family history on file. Review of Systems  ROS from attending provider note from 02/15/22 reviewed and changes (other than per HPI) include : none.      Objective:     Vital signs in last 24 hours:  Visit Vitals  BP (!) 103/58 (BP 1 Location: Right upper arm, BP Patient Position: At rest;Semi fowlers)   Pulse 83   Temp 98.9 °F (37.2 °C)   Resp 18   Ht 5' 11\" (1.803 m)   Wt 86.2 kg (190 lb)   SpO2 94%   BMI 26.50 kg/m²       Intake/Output last 3 shifts:  Date 02/15/22 0700 - 02/16/22 0659 02/16/22 0700 - 02/17/22 0659   Shift 0461-7426 1444-6107 24 Hour Total 3001-4734 7232-7767 24 Hour Total   INTAKE   I.V.  1150(1.1) 1150(0.6)        Volume (sodium chloride 0.9 % bolus infusion 1,000 mL)  1000 1000        Volume (magnesium sulfate 1 g/100 ml IVPB (premix or compounded))  100 100        Volume (calcium gluconate 1 gram in sodium chloride (ISO-OSM) 50 mL infusion)  50 50      Blood  457.9 457.9        Volume (TRANSFUSE PACKED RBC'S)  457.9 457.9      Shift Total(mL/kg)  1607. 9(18.7) 1607. 9(18.7)      OUTPUT   Shift Total(mL/kg)         NET  1607.9 1607.9      Weight (kg)  86.2 86.2 86.2 86.2 86.2         Physical Exam  General: NAD, A&O  HEENT: lids normal, no tracheal deviation, no lesions or deformities  Pulmonary: Normal work of breathing   Abdomen: soft, NTTP, nondistended, no suprapubic fullness or tenderness  : voiding independently, urine pink tinged/milky   Gait: not observed  Neuro: Appropriate, no focal neurological deficits  Mood/Affect: normal    Lab/Imaging Review:       Most Recent Labs:  Lab Results   Component Value Date/Time    WBC 6.2 02/16/2022 12:15 AM    HGB 7.7 (L) 02/16/2022 11:18 AM    HCT 24.9 (L) 02/16/2022 11:18 AM    PLATELET 537 (L) 98/28/9172 12:15 AM    MCV 93.4 02/16/2022 12:15 AM        Lab Results   Component Value Date/Time    Sodium 145 02/16/2022 12:15 AM    Potassium 3.6 02/16/2022 11:18 AM    Chloride 117 (H) 02/16/2022 12:15 AM    CO2 19 (L) 02/16/2022 12:15 AM    Anion gap 9 02/16/2022 12:15 AM    Glucose 148 (H) 02/16/2022 12:15 AM    BUN 14 02/16/2022 12:15 AM    Creatinine 0.79 02/16/2022 12:15 AM    BUN/Creatinine ratio 18 02/16/2022 12:15 AM    GFR est AA >60 02/16/2022 12:15 AM    GFR est non-AA >60 02/16/2022 12:15 AM    Calcium 5.5 (LL) 02/16/2022 12:15 AM    Bilirubin, total 1.1 (H) 02/16/2022 12:15 AM    Alk.  phosphatase 123 (H) 02/16/2022 12:15 AM    Protein, total 5.1 (L) 02/16/2022 12:15 AM Albumin 2.4 (L) 02/16/2022 12:15 AM    Globulin 2.7 02/16/2022 12:15 AM    A-G Ratio 0.9 (L) 02/16/2022 12:15 AM    ALT (SGPT) 22 02/16/2022 12:15 AM        No results found for: PSA, Karolynn Feather, PSAR3, [id number], [id number], 65016, PSAEXT     COAGS:  No results found for: APTT, PTP, INR, INREXT    No results found for: HBA1C, SMB1WDUR, MGY0MNXW     No results found for: CPK, RCK1, RCK2, RCK3, RCK4, CKMB, CKNDX, CKND1, TROPT, TROIQ, BNPP, BNP       Urine/Blood Cultures:  Results     Procedure Component Value Units Date/Time    URINE CULTURE HOLD SAMPLE [[id number]] Collected: 02/16/22 0033    Order Status: Completed Specimen: Urine from Serum Updated: 02/16/22 0050     Urine culture hold       Urine on hold in Microbiology dept for 2 days. If unpreserved urine is submitted, it cannot be used for addtional testing after 24 hours, recollection will be required.           RESPIRATORY VIRUS PANEL W/COVID-19, PCR [[id number]] Collected: 02/16/22 0016    Order Status: Completed Specimen: Nasopharyngeal Updated: 02/16/22 0708     Adenovirus Not detected        Coronavirus 229E Not detected        Coronavirus HKU1 Not detected        Coronavirus CVNL63 Not detected        Coronavirus OC43 Not detected        SARS-CoV-2, PCR Not detected        Metapneumovirus Not detected        Rhinovirus and Enterovirus Not detected        Influenza A Not detected        Influenza A, subtype H1 Not detected        Influenza A, subtype H3 Not detected        INFLUENZA A H1N1 PCR Not detected        Influenza B Not detected        Parainfluenza 1 Not detected        Parainfluenza 2 Not detected        Parainfluenza 3 Not detected        Parainfluenza virus 4 Not detected        RSV by PCR Not detected        B. parapertussis, PCR Not detected        Bordetella pertussis - PCR Not detected        Chlamydophila pneumoniae DNA, QL, PCR Not detected        Mycoplasma pneumoniae DNA, QL, PCR Not detected IMAGING:  No results found.         Signed By: Geronimo Kamara NP  - February 16, 2022

## 2022-02-16 NOTE — PROGRESS NOTES
Problem: Self Care Deficits Care Plan (Adult)  Goal: *Acute Goals and Plan of Care (Insert Text)  Description: FUNCTIONAL STATUS PRIOR TO ADMISSION: Patient was modified independent using a single point cane for functional mobility. Patient was independent for basic and instrumental ADLs. Pt drives and completes IADLs at baseline. HOME SUPPORT: The patient lived with wife and 2 sons but did not require assist. Pt is from [place] but is here in [place] visiting his dtr. Occupational Therapy Goals  Initiated 2/16/2022  1. Patient will perform grooming, standing at sink, with modified independence within 7 day(s). 2.  Patient will perform lower body dressing with supervision/set-up within 7 day(s). 3.  Patient will perform bathing, sitting and standing PRN, with modified independence within 7 day(s). 4.  Patient will perform toilet transfers and all aspects of toileting with modified independence within 7 day(s). Outcome: Progressing Towards Goal     OCCUPATIONAL THERAPY EVALUATION  Patient: Charles Fung (21 y.o. male)  Date: 2/16/2022  Primary Diagnosis: Anemia [D64.9]        Precautions: Fall       ASSESSMENT  Based on the objective data described below, the patient presents with decreased activity tolerance, impaired mobility, decreased strength/ROM in R LE, swelling/pain in R ankle/foot, and impaired dynamic standing balance impacting safety and independence with ADLs and mobility following admission for dizziness, nausea, and anemia. Pt is s/p 2 blood transfusion this AM, now with hgb of 7.7 and cleared to participate with therapy. Pt also with h/o gout and has been seen by ortho for R LE pain and swelling. Today, pt is received in bed with supportive dtr at bedside and is agreeable to participate with therapy. He is able to manage distal LB dressing tasks with min A and requires overall SBA/CGA for toileting and standing portions of ADLs.  He is observed to have difficulty advancing R LE during mobility to bathroom, relying heavily on IV pole and personal RW; PT to provide RW for use while in hospital. At this time, pt is functioning just below his independent baseline. Will follow while in acute setting but anticipate continued progress and ability to return home at discharge. Of note: pt with untouched breakfast and lunch trays in room, reporting he drinks Ensure at baseline. Discussed with RN regarding potential need for Nutrition/Dietary consult in order to have cold Ensure drinks added to patient's meal trays (he reports enjoying vanilla or strawberry flavors). Current Level of Function Impacting Discharge (ADLs/self-care): overall set up to SBA/CGA; up to min A for distal LB dressing; CGA for ADL transfers    Functional Outcome Measure: The patient scored Total: 50/100 on the Barthel Index outcome measure which is indicative of being moderately impaired in basic self-care. Other factors to consider for discharge: undergoing chemo for prostate CA; from out of town ([place]); supportive family; independent PLOF     Patient will benefit from skilled therapy intervention to address the above noted impairments. PLAN :  Recommendations and Planned Interventions: self care training, functional mobility training, therapeutic exercise, balance training, therapeutic activities, endurance activities, patient education, home safety training and family training/education    Frequency/Duration: Patient will be followed by occupational therapy 5 times a week to address goals. Recommendation for discharge: (in order for the patient to meet his/her long term goals)  Anticipate none    This discharge recommendation:  Has not yet been discussed the attending provider and/or case management    IF patient discharges home will need the following DME: TBD on RW       SUBJECTIVE:   Patient stated It doesn't hurt too badly now.     OBJECTIVE DATA SUMMARY:   HISTORY:   Past Medical History:   Diagnosis Date    [facility]di[facility] mellitus) ([facility] [address]     GERD (gastroesophageal reflux disease)     HTN (hypertension)     Prostate cancer metastatic to bone (HCC)     Urinary incontinence    No past surgical history on file. Expanded or extensive additional review of patient history:     Home Situation  Home Environment: Private residence  # Steps to Enter: 1 (one step down)  Rails to [employer]: No  One/Two Story Residence: Two story  Living Alone: No  Support Systems: Spouse/Significant Other (pt livesd in pvt residece w/ spouse and 2 sons, at baseline pt is ambulatory, iADLS, drives)  Patient Expects to be Discharged to[de-identified] Home with outpatient services    Hand dominance: Right    EXAMINATION OF PERFORMANCE DEFICITS:  Cognitive/Behavioral Status:  Neurologic State: Alert  Orientation Level: Oriented X4  Cognition: Appropriate decision making; Appropriate for age attention/concentration; Appropriate safety awareness; Follows commands  Perception: Appears intact  Perseveration: No perseveration noted  Safety/Judgement: Awareness of environment; Fall prevention;Good awareness of safety precautions; Home safety; Insight into deficits    Hearing: Auditory  Auditory Impairment: Hard of hearing, bilateral    Vision/Perceptual:    Corrective Lenses: Glasses    Range of Motion:  AROM: Generally decreased, functional    Strength:  Strength: Generally decreased, functional    Coordination:  Coordination: Within functional limits  Fine Motor Skills-Upper: Left Intact; Right Intact    Gross Motor Skills-Upper: Left Intact; Right Intact    Tone:  Tone: Normal    Balance:  Sitting: Intact  Standing: Impaired; With support (with SPC)  Standing - Static: Good;Constant support  Standing - Dynamic : Good;Fair;Constant support    Functional Mobility and Transfers for ADLs:  Bed Mobility:  Rolling: Modified independent  Supine to Sit: Modified independent  Sit to Supine: Modified independent  Scooting: Modified independent    Transfers:  Sit to Stand: Contact guard assistance  Stand to Sit: Contact guard assistance  Bed to Chair: Contact guard assistance  Bathroom Mobility: Contact guard assistance  Toilet Transfer : Contact guard assistance    ADL Assessment:  Feeding: Independent    Oral Facial Hygiene/Grooming: Stand-by assistance;Contact guard assistance (standing at sink)    Bathing: Contact guard assistance    Upper Body Dressing: Independent    Lower Body Dressing: Minimum assistance    Toileting: Supervision;Stand by assistance                ADL Intervention and task modifications:       Grooming  Grooming Assistance: Stand-by assistance;Contact guard assistance  Position Performed: Standing (at sink)  Washing Hands: Stand-by assistance;Contact guard assistance                   Lower Body Dressing Assistance  Slip on Shoes Without Back: Minimum assistance  Leg Crossed Method Used: No  Position Performed: Seated edge of bed  Cues: Don;Physical assistance    Toileting  Bladder Hygiene: Modified independent  Bowel Hygiene: Supervision;Modified indpendent  Clothing Management: Stand-by assistance  Adaptive Equipment: Grab bars    Cognitive Retraining  Safety/Judgement: Awareness of environment; Fall prevention;Good awareness of safety precautions; Home safety; Insight into deficits     Functional Measure:    Barthel Index:  Bathin  Bladder: 10  Bowels: 5  Groomin  Dressin  Feeding: 10  Mobility: 0  Stairs: 0  Toilet Use: 5  Transfer (Bed to Chair and Back): 10  Total: 50/100      The Barthel ADL Index: Guidelines  1. The index should be used as a record of what a patient does, not as a record of what a patient could do. 2. The main aim is to establish degree of independence from any help, physical or verbal, however minor and for whatever reason. 3. The need for supervision renders the patient not independent. 4. A patient's performance should be established using the best available evidence.  Asking the patient, friends/relatives and nurses are the usual sources, but direct observation and common sense are also important. However direct testing is not needed. 5. Usually the patient's performance over the preceding 24-48 hours is important, but occasionally longer periods will be relevant. 6. Middle categories imply that the patient supplies over 50 per cent of the effort. 7. Use of aids to be independent is allowed. Score Interpretation (from [address] [facility] [address])    Independent   60-79 Minimally independent   40-59 Partially dependent   20-39 Very dependent   <20 Totally dependent     -Chinmay Villanueva., Barthel, D.W. (1965). Functional evaluation: the Barthel Index. [address] ([address] [facility] (1997). The Barthel activities of daily living index: self-reporting versus actual performance in the old (> or = 75 years). Journal of [address] [place] [address] 45(7), [address] [facility], ZIGGY, Hugo Givens., Marisa Alfaro. (1999). Measuring the change in disability after inpatient rehabilitation; comparison of the responsiveness of the Barthel Index and Functional [place] Measure. Journal of Neurology, Neurosurgery, and Psychiatry, 66(4), 703-553. Linda Gusman, N.J.A, Aspen Lopez,  W.J.BRANDON, & Cosme Davalos M.A. (2004) Assessment of post-stroke quality of life in cost-effectiveness studies: The usefulness of the Barthel Index and the EuroQoL-5D. Quality of Life Research, 15, 836-92     Occupational Therapy Evaluation Charge Determination   History Examination Decision-Making   LOW Complexity : Brief history review  MEDIUM Complexity : 3-5 performance deficits relating to physical, cognitive , or psychosocial skils that result in activity limitations and / or participation restrictions MEDIUM Complexity : Patient may present with comorbidities that affect occupational performnce.  Miniml to moderate modification of tasks or assistance (eg, physical or verbal ) with assesment(s) is necessary to enable patient to complete evaluation       Based on the above components, the patient evaluation is determined to be of the following complexity level: LOW   Pain Rating:  Pt reporting minimal pain    Activity Tolerance:   Fair    After treatment patient left in no apparent distress:    Supine in bed, Heels elevated for pressure relief, Call bell within reach, Bed / chair alarm activated, Caregiver / family present and Side rails x 3    COMMUNICATION/EDUCATION:   The patients plan of care was discussed with: Physical therapist and Registered nurse. Home safety education was provided and the patient/caregiver indicated understanding., Patient/family have participated as able in goal setting and plan of care. and Patient/family agree to work toward stated goals and plan of care. This patients plan of care is appropriate for delegation to [facility].     Thank you for this referral.  Vicky Howell OT  Time Calculation: 18 mins

## 2022-02-16 NOTE — CONSULTS
[address] [place] [address]. [address] [place] [address] NP  [phone]                    GASTROENTEROLOGY CONSULTATION NOTE              NAME:  Som Ho   :   1955   MRN:   [id number]       Referring Physician:   Dr. Alexandru Ramos Date:   2022 4:17 PM    Chief Complaint:    Diarrhea     History of Present Illness:    Patient is a 78 y.o. who with past medical history of prostate cancer with mets to bone. He has received 2 cycles of chemo in [place] where he lives. While here on vacation he developed shortness of breath along with fatigue and presented here for evaluation. He has been having intermittent diarrhea. Often post prandial, no blood in the stools. His last colonoscopy was appx 10 years ago and polyps were found. Who we have been asked to see in consultatino for diarrhea. PMH:  Past Medical History:   Diagnosis Date    DM (diabetes mellitus) ([facility]     GERD (gastroesophageal reflux disease)     HTN (hypertension)     Prostate cancer metastatic to bone (HCC)     Urinary incontinence        PSH:  No past surgical history on file. Allergies:  No Known Allergies    Home Medications:  Prior to Admission Medications   Prescriptions Last Dose Informant Patient Reported? Taking?   calcium-cholecalciferol, D3, (CALTRATE 600+D) tablet Unknown at Unknown time  Yes No   Sig: Take 1 Tablet by mouth daily. ibuprofen (MOTRIN) 600 mg tablet 2/15/2022 at Unknown time  Yes Yes   Sig: Take 600 mg by mouth two (2) times a day. PRN   megestroL (MEGACE) 400 mg/10 mL (40 mg/mL) suspension 2/15/2022 at Unknown time  Yes Yes   Sig: Take 400 mg by mouth daily. ondansetron hcl (ZOFRAN) 4 mg tablet 2/15/2022 at Unknown time  Yes Yes   Sig: Take 4 mg by mouth every eight (8) hours as needed for Nausea or Nausea or Vomiting. predniSONE (DELTASONE) 1 mg tablet 2/15/2022 at Unknown time  Yes Yes   Sig: Take 5 mg by mouth daily.       Facility-Administered Medications: None       Hospital Medications:  Current Facility-Administered Medications   Medication Dose Route Frequency    0.9% sodium chloride infusion 250 mL  250 mL IntraVENous PRN    [START ON 2/17/2022] megestroL (MEGACE) 400 mg/10 mL (40 mg/mL) oral suspension 400 mg  400 mg Oral DAILY    [START ON 2/17/2022] predniSONE (DELTASONE) tablet 5 mg  5 mg Oral DAILY    folic acid (FOLVITE) tablet 1 mg  1 mg Oral DAILY    insulin lispro (HUMALOG) injection   SubCUTAneous AC&HS    glucose chewable tablet 16 g  4 Tablet Oral PRN    dextrose (D50W) injection syrg 12.5-25 g  12.5-25 g IntraVENous PRN    glucagon (GLUCAGEN) injection 1 mg  1 mg IntraMUSCular PRN    0.9% sodium chloride infusion 250 mL  250 mL IntraVENous PRN    0.9% sodium chloride with KCl 40 mEq/L infusion   IntraVENous CONTINUOUS    acetaminophen (TYLENOL) tablet 650 mg  650 mg Oral Q6H PRN    Or    acetaminophen (TYLENOL) suppository 650 mg  650 mg Rectal Q6H PRN    polyethylene glycol (MIRALAX) packet 17 g  17 g Oral DAILY PRN    ondansetron (ZOFRAN ODT) tablet 4 mg  4 mg Oral Q8H PRN    Or    ondansetron (ZOFRAN) injection 4 mg  4 mg IntraVENous Q6H PRN       Social History:  Social History     Tobacco Use    Smoking status: Not on file    Smokeless tobacco: Not on file   Substance Use Topics    Alcohol use: Not on file       Family History:  No family history on file.     Review of Systems:  Constitutional: negative fever, negative chills, negative weight loss  Eyes:   negative visual changes  ENT:   negative sore throat, tongue or lip swelling  Respiratory:  negative cough, negative dyspnea  Cards:  negative for chest pain, palpitations, lower extremity edema  GI:   See HPI  :  negative for frequency, dysuria  Integument:  negative for rash and pruritus  Heme:  negative for easy bruising and gum/nose bleeding  Musculoskel: negative for myalgias,  back pain and muscle weakness  Neuro: negative for headaches, dizziness, vertigo  Psych:  negative for feelings of anxiety, depression     Objective:     Patient Vitals for the past 8 hrs:   BP Temp Pulse Resp SpO2   02/16/22 1550 105/67 99 °F (37.2 °C) 90 18 100 %   02/16/22 1500   97     02/16/22 1308 109/68 98.3 °F (36.8 °C) 91 18 98 %   02/16/22 1035 (!) 103/58  83     02/16/22 0854 104/67 98.9 °F (37.2 °C) 82 18 94 %     02/16 0701 - 02/16 1900  In: 461.5   Out: -   02/14 1901 - 02/16 0700  In: 1607.9 [I.V.:1150]  Out: -     EXAM:     NEURO-alert, oriented x3, affect appropriate   HEENT-Head: Normocephalic, no lesions, without obvious abnormality. LUNGS-clear to auscultation bilaterally    COR-regular rate and rhythym     ABD- soft, non-tender. Bowel sounds normal. No masses,  no organomegaly     EXT-no edema    Skin - No rash     Data Review     Recent Labs     02/16/22  1521 02/16/22  1118 02/16/22  0015 02/16/22  0015 02/15/22  2200 02/15/22  2200   WBC  --   --   --  6.2  --  7.9   HGB 7.4* 7.7*   < > 5.1*   < > 6.4*   HCT 23.5* 24.9*   < > 16.9*   < > 20.8*   PLT  --   --   --  104*  --  122*    < > = values in this interval not displayed. Recent Labs     02/16/22  1118 02/16/22  0015 02/15/22  2200 02/15/22  2200   NA  --  145  --  141   K 3.6 2.8*   < > 3.0*   CL  --  117*  --  111*   CO2  --  19*  --  22   BUN  --  14  --  15   CREA  --  0.79  --  1.10   GLU  --  148*  --  174*   CA  --  5.5*  --  6.6*    < > = values in this interval not displayed. Recent Labs     02/16/22  0015 02/15/22  2200   * 149*   TP 5.1* 6.7   ALB 2.4* 2.8*   GLOB 2.7 3.9     No results for input(s): INR, PTP, APTT, INREXT in the last 72 hours.     Patient Active Problem List   Diagnosis Code    Anemia D64.9    GERD (gastroesophageal reflux disease) K21.9    HTN (hypertension) I10    Prostate cancer metastatic to bone ([facility] [address] [facility] C61, C79.51    Urinary incontinence R32    Anorexia R63.0    Nausea R11.0    Orthostatic dizziness R42    Weakness R53.1    Hypotension I95.9    Hypokalemia E87.6    Hypoalbuminemia E88.09    DM [facility]diabetes mellitus) ([facility] E11.9       Assessment and Plan:  Diarrhea:    - Stool tests pending collection  - Supportive care with imodium  - Diet as tolerated  - Continue monitoring electrolytes. - Continue supportive care. - CT of abdomen and pelvis is pending. Following. Thanks for allowing me to participate in the care of this patient.   Signed By: Norbetro Cordova NP     2/16/2022  4:17 PM

## 2022-02-16 NOTE — ED NOTES
TRANSFER - OUT REPORT:    Verbal report given to 5th floor RN(name) on [facility]  being transferred to 5th floor(unit) for routine progression of care       Report consisted of patients Situation, Background, Assessment and   Recommendations(SBAR). Information from the following report(s) SBAR, ED Summary and MAR was reviewed with the receiving nurse. Lines:   Peripheral IV 02/15/22 Distal;Left Basilic (Active)       Peripheral IV 02/16/22 Anterior;Distal;Left Wrist (Active)        Opportunity for questions and clarification was provided.       Patient transported with:   Monitor

## 2022-02-16 NOTE — CONSULTS
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 He has a past medical history of bilateral pes planus with PTTD, stage 4 prostate Ca, HTN, GERD, gout, and DM-2. He is visiting his daughter in [place] and developed weakness and hypotension last night. He was found to be severely anemic and admitted for the above. He has a history of gout and reports that 2 weeks ago he had a gout flare in his left 1st MTP. This resolved with use of ibuprofen. He reports new onset of right great toe/1st MTP swelling and pain. He reports no other arthralgias. He reports a long history of BL ankle pain/swelling/edema. He denies new onset BL ankle pain. He is currently on chemotherapy. No fevers. No open wounds of BL feet. Patient Active Problem List    Diagnosis Date Noted    Anemia 02/15/2022    Anorexia 02/15/2022    Nausea 02/15/2022    Orthostatic dizziness 02/15/2022    Weakness 02/15/2022    Hypotension 02/15/2022    Hypokalemia 02/15/2022    Hypoalbuminemia 02/15/2022    GERD (gastroesophageal reflux disease)     HTN (hypertension)     Prostate cancer metastatic to bone (HCC)     Urinary incontinence     DM (diabetes mellitus) ([facility]      No family history on file. Social History     Tobacco Use    Smoking status: Not on file    Smokeless tobacco: Not on file   Substance Use Topics    Alcohol use: Not on file     Past Medical History:   Diagnosis Date    DM (diabetes mellitus) ([facility] [facility]     GERD (gastroesophageal reflux disease)     HTN (hypertension)     Prostate cancer metastatic to bone ([facility]     Urinary incontinence       No past surgical history on file. Prior to Admission medications    Medication Sig Start Date End Date Taking?  Authorizing Provider   megestroL (MEGACE) 400 mg/10 mL (40 mg/mL) suspension Take 400 mg by mouth daily. Yes Provider, Historical   predniSONE (DELTASONE) 1 mg tablet Take 5 mg by mouth daily. Yes Provider, Historical   ondansetron hcl (ZOFRAN) 4 mg tablet Take 4 mg by mouth every eight (8) hours as needed for Nausea or Nausea or Vomiting. Yes Provider, Historical   ibuprofen (MOTRIN) 600 mg tablet Take 600 mg by mouth two (2) times a day. PRN   Yes Provider, Historical   calcium-cholecalciferol, D3, (CALTRATE 600+D) tablet Take 1 Tablet by mouth daily. Provider, Historical     Current Facility-Administered Medications   Medication Dose Route Frequency    0.9% sodium chloride infusion 250 mL  250 mL IntraVENous PRN    [START ON 2/17/2022] megestroL (MEGACE) 400 mg/10 mL (40 mg/mL) oral suspension 400 mg  400 mg Oral DAILY    [START ON 2/17/2022] predniSONE (DELTASONE) tablet 5 mg  5 mg Oral DAILY    folic acid (FOLVITE) tablet 1 mg  1 mg Oral DAILY    insulin lispro (HUMALOG) injection   SubCUTAneous AC&HS    glucose chewable tablet 16 g  4 Tablet Oral PRN    dextrose (D50W) injection syrg 12.5-25 g  12.5-25 g IntraVENous PRN    glucagon (GLUCAGEN) injection 1 mg  1 mg IntraMUSCular PRN    0.9% sodium chloride infusion 250 mL  250 mL IntraVENous PRN    0.9% sodium chloride with KCl 40 mEq/L infusion   IntraVENous CONTINUOUS    acetaminophen (TYLENOL) tablet 650 mg  650 mg Oral Q6H PRN    Or    acetaminophen (TYLENOL) suppository 650 mg  650 mg Rectal Q6H PRN    polyethylene glycol (MIRALAX) packet 17 g  17 g Oral DAILY PRN    ondansetron (ZOFRAN ODT) tablet 4 mg  4 mg Oral Q8H PRN    Or    ondansetron (ZOFRAN) injection 4 mg  4 mg IntraVENous Q6H PRN     No Known Allergies     Review of Systems:  Pertinent items are noted in HPI.     Objective:     Patient Vitals for the past 8 hrs:   BP Temp Pulse Resp SpO2   02/16/22 1308 109/68 98.3 °F (36.8 °C) 91 18 98 %   02/16/22 1035 (!) 103/58  83     02/16/22 0854 104/67 98.9 °F (37.2 °C) 82 18 94 %   02/16/22 0730  98.9 °F (37.2 °C) 80 16    22 0700 (!) 101/59  80  98 %   22 0630  98.1 °F (36.7 °C)  16    22 0600 (!) 93/59 98.1 °F (36.7 °C) 77 16 99 %   22 0545 97/60 97.9 °F (36.6 °C) 78 16 99 %     Temp (24hrs), Av.8 °F (37.1 °C), Min:97.9 °F (36.6 °C), Max:99.7 °F (37.6 °C)        EXAM: GEN: Well appearing in NAD  PSYCH:  AAO x 4  MUSC: Right foot with mild edema in the hindfoot and midfoot. There are obvious degenerative changes in the bl tibiotalar joints with osteophytes and small effusions BL. No erythema. Ankle ROM is pain free. There is pain to palpation of the 1st MTP of the right foot. There is no erythema of the hind, mid, or forefoot. No lesions at the 1st MTP. No abrasions of the 1st MTP. Small right knee effusion. No left knee effusion. ROM of BL knees are pain free - PROM: 0-125.    + DP pulses BL LE. SILT BL LE    IMAGING:  PENDING    Data Review   Recent Results (from the past 24 hour(s))   CBC WITH AUTOMATED DIFF    Collection Time: 02/15/22 10:00 PM   Result Value Ref Range    WBC 7.9 4.1 - 11.1 K/uL    RBC 2.24 (L) 4.10 - 5.70 M/uL    HGB 6.4 (L) 12.1 - 17.0 g/dL    HCT 20.8 (L) 36.6 - 50.3 %    MCV 92.9 80.0 - 99.0 FL    MCH 28.6 26.0 - 34.0 PG    MCHC 30.8 30.0 - 36.5 g/dL    RDW 21.1 (H) 11.5 - 14.5 %    PLATELET 284 (L) 388 - 400 K/uL    MPV 10.0 8.9 - 12.9 FL    NRBC 8.8 (H) 0  WBC    ABSOLUTE NRBC 0.69 (H) 0.00 - 0.01 K/uL    NEUTROPHILS 62 32 - 75 %    BAND NEUTROPHILS 9 (H) 0 - 6 %    LYMPHOCYTES 9 (L) 12 - 49 %    MONOCYTES 9 5 - 13 %    EOSINOPHILS 0 0 - 7 %    BASOPHILS 0 0 - 1 %    METAMYELOCYTES 7 (H) 0 %    MYELOCYTES 4 (H) 0 %    IMMATURE GRANULOCYTES 0 %    ABS. NEUTROPHILS 5.6 1.8 - 8.0 K/UL    ABS. LYMPHOCYTES 0.7 (L) 0.8 - 3.5 K/UL    ABS. MONOCYTES 0.7 0.0 - 1.0 K/UL    ABS. EOSINOPHILS 0.0 0.0 - 0.4 K/UL    ABS. BASOPHILS 0.0 0.0 - 0.1 K/UL    ABS. IMM.  GRANS. 0.0 K/UL    DF MANUAL      RBC COMMENTS TEARDROP CELLS  PRESENT        RBC COMMENTS ANISOCYTOSIS  2+       METABOLIC PANEL, COMPREHENSIVE    Collection Time: 02/15/22 10:00 PM   Result Value Ref Range    Sodium 141 136 - 145 mmol/L    Potassium 3.0 (L) 3.5 - 5.1 mmol/L    Chloride 111 (H) 97 - 108 mmol/L    CO2 22 21 - 32 mmol/L    Anion gap 8 5 - 15 mmol/L    Glucose 174 (H) 65 - 100 mg/dL    BUN 15 6 - 20 MG/DL    Creatinine 1.10 0.70 - 1.30 MG/DL    BUN/Creatinine ratio 14 12 - 20      GFR est AA >60 >60 ml/min/1.73m2    GFR est non-AA >60 >60 ml/min/1.73m2    Calcium 6.6 (L) 8.5 - 10.1 MG/DL    Bilirubin, total 1.3 (H) 0.2 - 1.0 MG/DL    ALT (SGPT) 27 12 - 78 U/L    AST (SGOT) 20 15 - 37 U/L    Alk. phosphatase 149 (H) 45 - 117 U/L    Protein, total 6.7 6.4 - 8.2 g/dL    Albumin 2.8 (L) 3.5 - 5.0 g/dL    Globulin 3.9 2.0 - 4.0 g/dL    A-G Ratio 0.7 (L) 1.1 - 2.2     SAMPLES BEING HELD    Collection Time: 02/15/22 10:00 PM   Result Value Ref Range    SAMPLES BEING HELD 1SSR,1RED,1GRN     COMMENT        Add-on orders for these samples will be processed based on acceptable specimen integrity and analyte stability, which may vary by analyte. TROPONIN-HIGH SENSITIVITY    Collection Time: 02/15/22 10:00 PM   Result Value Ref Range    Troponin-High Sensitivity 23 0 - 76 ng/L   NT-PRO BNP    Collection Time: 02/15/22 10:00 PM   Result Value Ref Range    NT pro-BNP 3,805 (H) <125 PG/ML   MAGNESIUM    Collection Time: 02/15/22 10:00 PM   Result Value Ref Range    Magnesium 1.7 1.6 - 2.4 mg/dL   RBC, ALLOCATE    Collection Time: 02/15/22 10:45 PM   Result Value Ref Range    HISTORY CHECKED?  Historical check performed    TYPE & SCREEN    Collection Time: 02/15/22 11:03 PM   Result Value Ref Range    Crossmatch Expiration 02/18/2022,2359     ABO/Rh(D) O POSITIVE     Antibody screen NEG     Unit number [id number]     Blood component type [facility]     Unit division 00     Status of unit ISSUED     Crossmatch result Compatible     Unit number [id number]     Blood component type [facility]     Unit division 00 Status of unit ISSUED     Crossmatch result Compatible    METABOLIC PANEL, COMPREHENSIVE    Collection Time: 02/16/22 12:15 AM   Result Value Ref Range    Sodium 145 136 - 145 mmol/L    Potassium 2.8 (L) 3.5 - 5.1 mmol/L    Chloride 117 (H) 97 - 108 mmol/L    CO2 19 (L) 21 - 32 mmol/L    Anion gap 9 5 - 15 mmol/L    Glucose 148 (H) 65 - 100 mg/dL    BUN 14 6 - 20 MG/DL    Creatinine 0.79 0.70 - 1.30 MG/DL    BUN/Creatinine ratio 18 12 - 20      GFR est AA >60 >60 ml/min/1.73m2    GFR est non-AA >60 >60 ml/min/1.73m2    Calcium 5.5 (LL) 8.5 - 10.1 MG/DL    Bilirubin, total 1.1 (H) 0.2 - 1.0 MG/DL    ALT (SGPT) 22 12 - 78 U/L    AST (SGOT) 17 15 - 37 U/L    Alk.  phosphatase 123 (H) 45 - 117 U/L    Protein, total 5.1 (L) 6.4 - 8.2 g/dL    Albumin 2.4 (L) 3.5 - 5.0 g/dL    Globulin 2.7 2.0 - 4.0 g/dL    A-G Ratio 0.9 (L) 1.1 - 2.2     CBC W/O DIFF    Collection Time: 02/16/22 12:15 AM   Result Value Ref Range    WBC 6.2 4.1 - 11.1 K/uL    RBC 1.81 (L) 4.10 - 5.70 M/uL    HGB 5.1 (LL) 12.1 - 17.0 g/dL    HCT 16.9 (LL) 36.6 - 50.3 %    MCV 93.4 80.0 - 99.0 FL    MCH 28.2 26.0 - 34.0 PG    MCHC 30.2 30.0 - 36.5 g/dL    RDW 21.1 (H) 11.5 - 14.5 %    PLATELET 265 (L) 888 - 400 K/uL    MPV 11.3 8.9 - 12.9 FL    NRBC 8.1 (H) 0  WBC    ABSOLUTE NRBC 0.50 (H) 0.00 - 0.01 K/uL   C REACTIVE PROTEIN, QT    Collection Time: 02/16/22 12:15 AM   Result Value Ref Range    C-Reactive protein 13.60 (H) 0.00 - 0.60 mg/dL   FERRITIN    Collection Time: 02/16/22 12:15 AM   Result Value Ref Range    Ferritin 1,226 (H) 26 - 388 NG/ML   FOLATE    Collection Time: 02/16/22 12:15 AM   Result Value Ref Range    Folate 4.5 (L) 5.0 - 21.0 ng/mL   HAPTOGLOBIN    Collection Time: 02/16/22 12:15 AM   Result Value Ref Range    Haptoglobin 149 30 - 200 mg/dL   IRON PROFILE    Collection Time: 02/16/22 12:15 AM   Result Value Ref Range    Iron 48 35 - 150 ug/dL    TIBC 185 (L) 250 - 450 ug/dL    Iron % saturation 26 20 - 50 %   LD    Collection Time: 02/16/22 12:15 AM   Result Value Ref Range     (H) 85 - 241 U/L   PROCALCITONIN    Collection Time: 02/16/22 12:15 AM   Result Value Ref Range    Procalcitonin 0.59 ng/mL   RETICULOCYTE COUNT    Collection Time: 02/16/22 12:15 AM   Result Value Ref Range    Reticulocyte count 3.0 (H) 0.7 - 2.1 %    Absolute Retic Cnt. 0.0538 0.0260 - 0.0950 M/ul   VITAMIN B12    Collection Time: 02/16/22 12:15 AM   Result Value Ref Range    Vitamin B12 1,883 (H) 193 - 986 pg/mL   SAMPLES BEING HELD    Collection Time: 02/16/22 12:15 AM   Result Value Ref Range    SAMPLES BEING HELD NO SAMPLE RECEIVED      COMMENT        Add-on orders for these samples will be processed based on acceptable specimen integrity and analyte stability, which may vary by analyte.    RESPIRATORY VIRUS PANEL W/COVID-19, PCR    Collection Time: 02/16/22 12:16 AM    Specimen: Nasopharyngeal   Result Value Ref Range    Adenovirus Not detected NOTD      Coronavirus 229E Not detected NOTD      Coronavirus HKU1 Not detected NOTD      Coronavirus CVNL63 Not detected NOTD      Coronavirus OC43 Not detected NOTD      SARS-CoV-2, PCR Not detected NOTD      Metapneumovirus Not detected NOTD      Rhinovirus and Enterovirus Not detected NOTD      Influenza A Not detected NOTD      Influenza A, subtype H1 Not detected NOTD      Influenza A, subtype H3 Not detected NOTD      INFLUENZA A H1N1 PCR Not detected NOTD      Influenza B Not detected NOTD      Parainfluenza 1 Not detected NOTD      Parainfluenza 2 Not detected NOTD      Parainfluenza 3 Not detected NOTD      Parainfluenza virus 4 Not detected NOTD      RSV by PCR Not detected NOTD      B. parapertussis, PCR Not detected NOTD      Bordetella pertussis - PCR Not detected NOTD      Chlamydophila pneumoniae DNA, QL, PCR Not detected NOTD      Mycoplasma pneumoniae DNA, QL, PCR Not detected NOTD     URINALYSIS W/MICROSCOPIC    Collection Time: 02/16/22 12:33 AM   Result Value Ref Range    Color DARK YELLOW Appearance CLOUDY (A) CLEAR      Specific gravity 1.023 1.003 - 1.030      pH (UA) 5.5 5.0 - 8.0      Protein 100 (A) NEG mg/dL    Glucose Negative NEG mg/dL    Ketone TRACE (A) NEG mg/dL    Blood Negative NEG      Urobilinogen 1.0 0.2 - 1.0 EU/dL    Nitrites Negative NEG      Leukocyte Esterase TRACE (A) NEG      WBC 20-50 0 - 4 /hpf    RBC 0-5 0 - 5 /hpf    Epithelial cells FEW FEW /lpf    Bacteria Negative NEG /hpf    Hyaline cast 5-10 0 - 5 /lpf    Granular cast 10-20 (A) NEG /lpf    WBC cast 5-10 (A) NEG /lpf   URINE CULTURE HOLD SAMPLE    Collection Time: 02/16/22 12:33 AM    Specimen: Serum; Urine   Result Value Ref Range    Urine culture hold        Urine on hold in Microbiology dept for 2 days. If unpreserved urine is submitted, it cannot be used for addtional testing after 24 hours, recollection will be required. BILIRUBIN, CONFIRM    Collection Time: 02/16/22 12:33 AM   Result Value Ref Range    Bilirubin UA, confirm Positive (A) NEG     RBC, ALLOCATE    Collection Time: 02/16/22  1:15 AM   Result Value Ref Range    HISTORY CHECKED? Historical check performed    HGB & HCT    Collection Time: 02/16/22 11:18 AM   Result Value Ref Range    HGB 7.7 (L) 12.1 - 17.0 g/dL    HCT 24.9 (L) 36.6 - 50.3 %   POTASSIUM    Collection Time: 02/16/22 11:18 AM   Result Value Ref Range    Potassium 3.6 3.5 - 5.1 mmol/L   OCCULT BLOOD, STOOL    Collection Time: 02/16/22  1:00 PM   Result Value Ref Range    Occult blood, stool Negative NEG           Assessment/Plan:   A: 1. 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 Xray of right foot to exclude trauma, but unlikely. Xray will better evaluate 1st MTP osseous destruction from chronic gout as well. In the setting of severe anemia and history of NSAID use, I would avoid all NSAIDS at this time.   Continue with steroids per hospitalist.  Patient needs rheumatology followup in [place] as discussed with patient and daughter today due to chronic nature/flare of his gouty arthropathy. Serum uric acid today. Elevation of BL LE. Consider hard sole shoe or CAM walker if worsening pain/debility, but with prednisone started, he will have improving pain. Nothing further from a surgical standpoint at this time. Please reconsult if questions or concerns. Discussed case with Dr. Fer Batista who agrees with plan.     Demar Ordonez, [place]   [facility]  [address] [facility]

## 2022-02-16 NOTE — ED TRIAGE NOTES
Pt c/o hypotension, nausea, lightheadedness, dizziness, and weakness for 2 days. Under chemo for prostate cancer in [place]. Daughter states since he's arrived he's gradually gotten worse.

## 2022-02-16 NOTE — PROGRESS NOTES
2/16/2022  11:44 AM  CM completed assessment w/ pt in person, CM wore mask and goggles at all times. Charted demographics verified, pt lives w/ spouse and his 2 sons in a 3 story home, there are no entry steps, at baseline pt is ambulatory w/ cane , independent w/ ADLs, pt has a a few falls since he has irma weak but has not needed medical attention. Reason for Admission:  OBS for anemia   Pt is a 77 yo AAM who [facility] to [facility] c/o Derridaniel Ke just completed his 2nd chemo for worsening prostate cancer 2 weeks ago in [place], before [address] in [place] visiting family. PMHx:    GERD (gastroesophageal reflux disease)      HTN (hypertension)      Prostate cancer metastatic to bone (HCC)      Urinary incontinence                   RUR Score:  N/A pt is OBS                   Plan for utilizing home health:   NO history, PT, OT evals pending      DME: cane, pt owns crutches   Rx: [employer], pt uses [facility] in [place], [place] fully covered for his medications  PCP: First and Last name: Dr Arturo Conley, [address] [place] [address]    Name of Practice:    Are you a current patient: Yes/No: YES   Approximate date of last visit: 2 weeks   Can you participate in a virtual visit with your PCP:  YES                    Current Advanced Directive/Advance Care Plan: Full Code  [facility] [phone]    Healthcare Decision Maker:   Click here to complete [address] including selection of the Healthcare Decision Maker Relationship (ie \"Primary\")                             Transition of Care Plan:                    RUR N/A pt is OBS  LOS  1 Day  1. Hospital admission for medical management   2. Urology consult  3. Ortho consult  4. PT, OT evals  5. CM to follow through for treatment/response  6. DC when stable to home w/ family assistance  7. Outpatient f/u PCP, oncology  8. Family to transport at [address] [place] [address] Management Interventions  PCP Verified by CM:  Yes Arturo Conley MD last visit 2 weeks)  Palliative Care Criteria Met (RRAT>21 & CHF Dx)?: No  Mode of Transport at Discharge: Self (Daughter)  Physical Therapy Consult: Yes  Occupational Therapy Consult: Yes  Speech Therapy Consult: No  Support Systems: Spouse/Significant Other (pt livesd in pvt residece w/ spouse and 2 sons, at baseline pt is ambulatory, iADLS, drives)  Confirm Follow Up Transport: Family  Discharge Location  Patient Expects to be Discharged to[de-identified] Home with outpatient services  MARÍA ELENA Altman

## 2022-02-16 NOTE — PROGRESS NOTES
Varun Cisneros [facility] 79  [address] [facility], [address] [place] [address]  [phone]      Medical Progress Note      NAME: Mateo Celaya   :  1955  MRM:  [id number]    Date/Time of service: 2022         Subjective:     Chief Complaint:  Patient was personally seen and examined by me during this time period. Chart reviewed. F/u anemia. Reports no overt bleeding, some light headedness, some pain of ankle joints. Objective:       Vitals:       Last 24hrs VS reviewed since prior progress note.  Most recent are:    Visit Vitals  BP (!) 103/58 (BP 1 Location: Right upper arm, BP Patient Position: At rest;Semi fowlers)   Pulse 83   Temp 98.9 °F (37.2 °C)   Resp 18   Ht 5' 11\" (1.803 m)   Wt 86.2 kg (190 lb)   SpO2 94%   BMI 26.50 kg/m²     SpO2 Readings from Last 6 Encounters:   22 94%            Intake/Output Summary (Last 24 hours) at 2022 1312  Last data filed at 2022 2722  Gross per 24 hour   Intake 1607.92 ml   Output    Net 1607.92 ml        Exam:     Physical Exam:    Gen:  Well-developed, well-nourished, in no acute distress  HEENT:  Pink conjunctivae, PERRL, hearing intact to voice  Resp:  No accessory muscle use, clear breath sounds without wheezes rales or rhonchi  Card:  RRR, No murmurs, normal S1, S2, no peripheral edema  Abd:  Soft, non-tender, non-distended, normoactive bowel sounds are present, no palpable organomegaly   Lymph:  No cervical adenopathy  Musc:  No cyanosis or clubbing  Skin:  No rashes or ulcers, skin turgor is good  Neuro:  Cranial nerves 3-12 are grossly intact, follows commands appropriately  Psych:  Oriented to person, place, and time, Alert with good insight      Medications Reviewed: (see below)    Lab Data Reviewed: (see below)    ______________________________________________________________________    Medications:     Current Facility-Administered Medications   Medication Dose Route Frequency    0.9% sodium chloride infusion 250 mL 250 mL IntraVENous PRN    [START ON 2/17/2022] megestroL (MEGACE) 400 mg/10 mL (40 mg/mL) oral suspension 400 mg  400 mg Oral DAILY    [START ON 2/17/2022] predniSONE (DELTASONE) tablet 5 mg  5 mg Oral DAILY    folic acid (FOLVITE) tablet 1 mg  1 mg Oral DAILY    0.9% sodium chloride infusion 250 mL  250 mL IntraVENous PRN    0.9% sodium chloride with KCl 40 mEq/L infusion   IntraVENous CONTINUOUS    acetaminophen (TYLENOL) tablet 650 mg  650 mg Oral Q6H PRN    Or    acetaminophen (TYLENOL) suppository 650 mg  650 mg Rectal Q6H PRN    polyethylene glycol (MIRALAX) packet 17 g  17 g Oral DAILY PRN    ondansetron (ZOFRAN ODT) tablet 4 mg  4 mg Oral Q8H PRN    Or    ondansetron (ZOFRAN) injection 4 mg  4 mg IntraVENous Q6H PRN          Lab Review:     Recent Labs     02/16/22  1118 02/16/22  0015 02/15/22  2200   WBC  --  6.2 7.9   HGB 7.7* 5.1* 6.4*   HCT 24.9* 16.9* 20.8*   PLT  --  104* 122*     Recent Labs     02/16/22  1118 02/16/22  0015 02/15/22  2200   NA  --  145 141   K 3.6 2.8* 3.0*   CL  --  117* 111*   CO2  --  19* 22   GLU  --  148* 174*   BUN  --  14 15   CREA  --  0.79 1.10   CA  --  5.5* 6.6*   MG  --   --  1.7   ALB  --  2.4* 2.8*   TBILI  --  1.1* 1.3*   ALT  --  22 27     No results found for: GLUCPOC       Assessment / Plan:     Symptomatic Anemia / Thrombocytopenia - POA, likely due to chemotherapy and folic acid deficiency. No e/o overt GI bleed; check occult stool. Folate level low; start oral folic acid. Consult oncology.     Orthostatic dizziness / Hx HTN (hypertension) - POA due to IVVD, anemia. On no home medications at home. IVF.      Hx of gout/ Gout flare: resume oral steroids. No NSAIDs. Consult orthopedics. Hypokalemia - likely due to poor PO intake. Replete PRN.      Anorexia / Nausea / GERD (gastroesophageal reflux disease) / Hypoalbuminemia - POA due to chemo. IV Zofran PRN. Continue home megace. Prostate cancer metastatic to bone - Followed by oncology in [place]. Consult urology and oncology; patient and family are concerned he needs further imaging today.      Urinary incontinence - defer to oncology     Diabetes - POA, hold oral meds. ISS.      Weakness - POA due to anemia, IVVD, chemo, cancer, deconditioning, etc. Fall precautions.   PT/OT eval      Total time spent with patient: 32 Minutes **I personally saw and examined the patient during this time period**                 Care Plan discussed with: Patient and Nursing Staff    Discussed:  Care Plan    Prophylaxis:  SCD's    Disposition:  Home w/Family           ___________________________________________________    Attending Physician: Cee Hernandez DO

## 2022-02-16 NOTE — ED PROVIDER NOTES
29-year-old male with a past medical history of metastatic prostate cancer (the lymph nodes and bone) presents to the ER today for evaluation of acute weakness, decreased appetite, dizziness, and exertional shortness of breath and fatigue. Patient lives in [place] and is visiting his family in [place] until Monday of next week. He states that he is currently getting infusions of his chemotherapeutic every 3 weeks, and most recently had a about 14 days ago. He states that since being here in [place] he is started to develop decreased appetite, orthostatic dizziness (progressing to dizziness while at rest), decreased exercise tolerance, and dyspnea with exertion. He denies a history of similar symptoms. He denies any history of VTE or anemia requiring blood transfusions. He denies CP, syncope, vertigo. No past medical history on file. No past surgical history on file. No family history on file. Social History     Socioeconomic History    Marital status: OTHER     Spouse name: Not on file    Number of children: Not on file    Years of education: Not on file    Highest education level: Not on file   Occupational History    Not on file   Tobacco Use    Smoking status: Not on file    Smokeless tobacco: Not on file   Substance and Sexual Activity    Alcohol use: Not on file    Drug use: Not on file    Sexual activity: Not on file   Other Topics Concern    Not on file   Social History Narrative    Not on file     Social Determinants of Health     Financial Resource Strain:     Difficulty of Paying Living Expenses: Not on file   Food Insecurity:     Worried About Running Out of Food in the Last Year: Not on file    [place] of Food in the Last Year: Not on file   Transportation Needs:     Lack of Transportation (Medical): Not on file    Lack of Transportation (Non-Medical):  Not on file   Physical Activity:     Days of Exercise per Week: Not on file    Minutes of Exercise per Session: Not on file   Stress:     Feeling of Stress : Not on file   Social Connections:     Frequency of Communication with Friends and Family: Not on file    Frequency of Social Gatherings with Friends and Family: Not on file    Attends Episcopal Services: Not on file    Active Member of Clubs or Organizations: Not on file    Attends Club or Organization Meetings: Not on file    Marital Status: Not on file   Intimate Partner Violence:     Fear of Current or Ex-Partner: Not on file    Emotionally Abused: Not on file    Physically Abused: Not on file    Sexually Abused: Not on file   Housing Stability:     Unable to Pay for Housing in the Last Year: Not on file    Number of [place] in the Last Year: Not on file    Unstable Housing in the Last Year: Not on file         ALLERGIES: Patient has no known allergies. Review of Systems   Constitutional: Positive for activity change, appetite change and fatigue. HENT: Negative for sore throat. Eyes: Negative for visual disturbance. Respiratory: Positive for shortness of breath. Cardiovascular: Negative for chest pain. Gastrointestinal: Positive for nausea and vomiting. Negative for abdominal pain and blood in stool. Genitourinary: Negative for dysuria. Musculoskeletal: Negative for myalgias. Skin: Negative for rash. Neurological: Positive for dizziness and light-headedness. Psychiatric/Behavioral: Negative for dysphoric mood. Vitals:    02/15/22 2141 02/15/22 2154 02/15/22 2202 02/15/22 2203   BP: (!) 91/58  (!) 102/57 119/68   Pulse:   90 (!) 114   Resp:   20 24   Temp:       SpO2:  100%     Weight:       Height:                Physical Exam  Vitals and nursing note reviewed. Constitutional:       General: He is not in acute distress. Appearance: Normal appearance. He is not ill-appearing. HENT:      Head: Normocephalic and atraumatic.       Nose: Nose normal.      Mouth/Throat:      Mouth: Mucous membranes are dry. Pharynx: Oropharynx is clear. Eyes:      Extraocular Movements: Extraocular movements intact. Cardiovascular:      Rate and Rhythm: Regular rhythm. Tachycardia present. Pulses: Normal pulses. Radial pulses are 2+ on the right side and 2+ on the left side. Dorsalis pedis pulses are 2+ on the right side and 2+ on the left side. Heart sounds: No murmur heard. No friction rub. No gallop. Comments: Trace edema bilaterally   Pulmonary:      Effort: Tachypnea present. Breath sounds: Normal breath sounds and air entry. No wheezing or rales. Abdominal:      General: Bowel sounds are normal. There is no distension. Palpations: Abdomen is soft. Tenderness: There is no abdominal tenderness. Musculoskeletal:         General: Normal range of motion. Cervical back: Normal range of motion and neck supple. Skin:     General: Skin is warm and dry. Coloration: Skin is pale. Neurological:      Mental Status: He is alert and oriented to person, place, and time. Mental status is at baseline.    Psychiatric:         Mood and Affect: Mood normal.         Behavior: Behavior normal.          MDM      VITAL SIGNS:  Patient Vitals for the past 4 hrs:   Temp Pulse Resp BP SpO2   02/15/22 2203  (!) 114 24 119/68    02/15/22 2202  90 20 (!) 102/57    02/15/22 2154     100 %   02/15/22 2141    (!) 91/58    02/15/22 2126 98.4 °F (36.9 °C) 88 16 106/63 99 %         LABS:  Recent Results (from the past 6 hour(s))   CBC WITH AUTOMATED DIFF    Collection Time: 02/15/22 10:00 PM   Result Value Ref Range    WBC 7.9 4.1 - 11.1 K/uL    RBC 2.24 (L) 4.10 - 5.70 M/uL    HGB 6.4 (L) 12.1 - 17.0 g/dL    HCT 20.8 (L) 36.6 - 50.3 %    MCV 92.9 80.0 - 99.0 FL    MCH 28.6 26.0 - 34.0 PG    MCHC 30.8 30.0 - 36.5 g/dL    RDW 21.1 (H) 11.5 - 14.5 %    PLATELET 886 (L) 354 - 400 K/uL    MPV 10.0 8.9 - 12.9 FL    NRBC 8.8 (H) 0  WBC    ABSOLUTE NRBC 0.69 (H) 0.00 - 0.01 K/uL    NEUTROPHILS 62 32 - 75 %    BAND NEUTROPHILS 9 (H) 0 - 6 %    LYMPHOCYTES 9 (L) 12 - 49 %    MONOCYTES 9 5 - 13 %    EOSINOPHILS 0 0 - 7 %    BASOPHILS 0 0 - 1 %    METAMYELOCYTES 7 (H) 0 %    MYELOCYTES 4 (H) 0 %    IMMATURE GRANULOCYTES 0 %    ABS. NEUTROPHILS 5.6 1.8 - 8.0 K/UL    ABS. LYMPHOCYTES 0.7 (L) 0.8 - 3.5 K/UL    ABS. MONOCYTES 0.7 0.0 - 1.0 K/UL    ABS. EOSINOPHILS 0.0 0.0 - 0.4 K/UL    ABS. BASOPHILS 0.0 0.0 - 0.1 K/UL    ABS. IMM. GRANS. 0.0 K/UL    DF MANUAL      RBC COMMENTS TEARDROP CELLS  PRESENT        RBC COMMENTS ANISOCYTOSIS  2+       METABOLIC PANEL, COMPREHENSIVE    Collection Time: 02/15/22 10:00 PM   Result Value Ref Range    Sodium 141 136 - 145 mmol/L    Potassium 3.0 (L) 3.5 - 5.1 mmol/L    Chloride 111 (H) 97 - 108 mmol/L    CO2 22 21 - 32 mmol/L    Anion gap 8 5 - 15 mmol/L    Glucose 174 (H) 65 - 100 mg/dL    BUN 15 6 - 20 MG/DL    Creatinine 1.10 0.70 - 1.30 MG/DL    BUN/Creatinine ratio 14 12 - 20      GFR est AA >60 >60 ml/min/1.73m2    GFR est non-AA >60 >60 ml/min/1.73m2    Calcium 6.6 (L) 8.5 - 10.1 MG/DL    Bilirubin, total 1.3 (H) 0.2 - 1.0 MG/DL    ALT (SGPT) 27 12 - 78 U/L    AST (SGOT) 20 15 - 37 U/L    Alk. phosphatase 149 (H) 45 - 117 U/L    Protein, total 6.7 6.4 - 8.2 g/dL    Albumin 2.8 (L) 3.5 - 5.0 g/dL    Globulin 3.9 2.0 - 4.0 g/dL    A-G Ratio 0.7 (L) 1.1 - 2.2     SAMPLES BEING HELD    Collection Time: 02/15/22 10:00 PM   Result Value Ref Range    SAMPLES BEING HELD 1SSR,1RED,1GRN     COMMENT        Add-on orders for these samples will be processed based on acceptable specimen integrity and analyte stability, which may vary by analyte.         IMAGING:  No orders to display         Medications During Visit:  Medications   0.9% sodium chloride infusion 250 mL (has no administration in time range)   potassium chloride SR (KLOR-CON 10) tablet 40 mEq (has no administration in time range)   sodium chloride 0.9 % bolus infusion 1,000 mL (1,000 mL IntraVENous New Bag 2/15/22 7057)         DECISION MAKING:  Kate James is a 78 y.o. male who comes in as above. Work-up remarkable for hemoglobin of 6.4 (microcytic), which explains  the patient's symptoms. I was able to review his most recent hemoglobin from an outside hospital which was collected less than 1 year ago it was 13.0. I suspect is due to being in the brady of his chemotherapeutic regimen that he is only received 2 doses of. Patient will receive 1 unit of PRBCs and will be admitted to medicine for further management. Perfect Serve Consult for Admission  11:16 PM    ED Room Number: HL44/45  Patient Name and age:  Kate James 78 y.o.  male  Working Diagnosis:   1. Symptomatic anemia    2. Near syncope        COVID-19 Suspicion:  no  Sepsis present:  no  Reassessment needed: no  Code Status:  Partial, does not want intubation  Readmission: no  Isolation Requirements:  no  Recommended Level of Care:  telemetry  Department:[facility] ED - [phone]  Other: Symptomatic anemia      IMPRESSION:  1. Symptomatic anemia    2. Near syncope        DISPOSITION:  Admitted    CRITICAL CARE NOTE :    11:18 PM      IMPENDING DETERIORATION -Cardiovascular    ASSOCIATED RISK FACTORS - Hypotension and CNS Decompensation    MANAGEMENT- Supervision of Care    INTERPRETATION -  ECG    INTERVENTIONS - hemodynamic mngmt      TREATMENT RESPONSE -Improved    PERFORMED BY - Self        NOTES   :      I have spent 30 minutes of critical care time involved in lab review, consultations with specialist, family decision- making, bedside attention and documentation. During this entire length of time I was immediately available to the patient .     Emily Russell NP

## 2022-02-17 ENCOUNTER — APPOINTMENT (OUTPATIENT)
Dept: NON INVASIVE DIAGNOSTICS | Age: 67
DRG: 812 | End: 2022-02-17
Attending: NURSE PRACTITIONER
Payer: MEDICARE

## 2022-02-17 PROBLEM — D64.9 ANEMIA: Status: ACTIVE | Noted: 2022-02-17

## 2022-02-17 LAB
ANION GAP SERPL CALC-SCNC: 7 MMOL/L (ref 5–15)
BASOPHILS # BLD: 0 K/UL (ref 0–0.1)
BASOPHILS NFR BLD: 0 % (ref 0–1)
BUN SERPL-MCNC: 16 MG/DL (ref 6–20)
BUN/CREAT SERPL: 17 (ref 12–20)
C DIFF GDH STL QL: NEGATIVE
C DIFF TOX A+B STL QL IA: NEGATIVE
CALCIUM SERPL-MCNC: 6.5 MG/DL (ref 8.5–10.1)
CAMPYLOBACTER SPECIES, DNA: NEGATIVE
CHLORIDE SERPL-SCNC: 118 MMOL/L (ref 97–108)
CO2 SERPL-SCNC: 19 MMOL/L (ref 21–32)
CREAT SERPL-MCNC: 0.93 MG/DL (ref 0.7–1.3)
DIFFERENTIAL METHOD BLD: ABNORMAL
ECHO AO ASC DIAM: 3.7 CM
ECHO AO ASCENDING AORTA INDEX: 1.8 CM/M2
ECHO AV AREA PEAK VELOCITY: 2.7 CM2
ECHO AV AREA PEAK VELOCITY: 2.7 CM2
ECHO AV AREA VTI: 2.8 CM2
ECHO AV AREA VTI: 2.8 CM2
ECHO AV MEAN GRADIENT: 8 MMHG
ECHO AV MEAN VELOCITY: 1.3 M/S
ECHO AV PEAK GRADIENT: 17 MMHG
ECHO AV PEAK VELOCITY: 2 M/S
ECHO AV VELOCITY RATIO: 0.65
ECHO AV VTI: 38.9 CM
ECHO LA DIAMETER INDEX: 2.18 CM/M2
ECHO LA DIAMETER: 4.5 CM
ECHO LA VOL 2C: 78 ML (ref 18–58)
ECHO LA VOL 4C: 91 ML (ref 18–58)
ECHO LA VOL BP: 86 ML (ref 18–58)
ECHO LA VOL BP: 86 ML (ref 18–58)
ECHO LA VOLUME AREA LENGTH: 90 ML
ECHO LA VOLUME INDEX A2C: 38 ML/M2 (ref 16–34)
ECHO LA VOLUME INDEX A4C: 44 ML/M2 (ref 16–34)
ECHO LA VOLUME INDEX AREA LENGTH: 44 ML/M2 (ref 16–34)
ECHO LV E' LATERAL VELOCITY: 10 CM/S
ECHO LV E' SEPTAL VELOCITY: 12 CM/S
ECHO LV FRACTIONAL SHORTENING: 36 % (ref 28–44)
ECHO LV INTERNAL DIMENSION DIASTOLE INDEX: 2.28 CM/M2
ECHO LV INTERNAL DIMENSION DIASTOLIC: 4.7 CM (ref 4.2–5.9)
ECHO LV INTERNAL DIMENSION SYSTOLIC INDEX: 1.46 CM/M2
ECHO LV INTERNAL DIMENSION SYSTOLIC: 3 CM
ECHO LV IVSD: 1.4 CM (ref 0.6–1)
ECHO LV MASS 2D: 265.2 G (ref 88–224)
ECHO LV MASS INDEX 2D: 128.7 G/M2 (ref 49–115)
ECHO LV POSTERIOR WALL DIASTOLIC: 1.4 CM (ref 0.6–1)
ECHO LV RELATIVE WALL THICKNESS RATIO: 0.6
ECHO LVOT AREA: 4.2 CM2
ECHO LVOT AV VTI INDEX: 0.68
ECHO LVOT DIAM: 2.3 CM
ECHO LVOT MEAN GRADIENT: 4 MMHG
ECHO LVOT PEAK GRADIENT: 7 MMHG
ECHO LVOT PEAK VELOCITY: 1.3 M/S
ECHO LVOT STROKE VOLUME INDEX: 53.6 ML/M2
ECHO LVOT SV: 110.5 ML
ECHO LVOT VTI: 26.6 CM
ECHO MV A VELOCITY: 0.7 M/S
ECHO MV E DECELERATION TIME (DT): 204.9 MS
ECHO MV E VELOCITY: 1.23 M/S
ECHO MV E/A RATIO: 1.76
ECHO MV E/E' LATERAL: 12.3
ECHO MV E/E' RATIO (AVERAGED): 11.28
ECHO MV E/E' SEPTAL: 10.25
ECHO PV MAX VELOCITY: 1 M/S
ECHO PV PEAK GRADIENT: 4 MMHG
ECHO RV INTERNAL DIMENSION: 5.1 CM
ECHO RV TAPSE: 2.6 CM (ref 1.5–2)
ECHO RVOT PEAK GRADIENT: 2 MMHG
ECHO RVOT PEAK VELOCITY: 0.8 M/S
ECHO TV REGURGITANT MAX VELOCITY: 2.7 M/S
ECHO TV REGURGITANT PEAK GRADIENT: 29 MMHG
ENTEROTOXIGEN E COLI, DNA: NEGATIVE
EOSINOPHIL # BLD: 0 K/UL (ref 0–0.4)
EOSINOPHIL NFR BLD: 0 % (ref 0–7)
ERYTHROCYTE [DISTWIDTH] IN BLOOD BY AUTOMATED COUNT: 21.2 % (ref 11.5–14.5)
GLUCOSE BLD STRIP.AUTO-MCNC: 130 MG/DL (ref 65–117)
GLUCOSE BLD STRIP.AUTO-MCNC: 153 MG/DL (ref 65–117)
GLUCOSE BLD STRIP.AUTO-MCNC: 161 MG/DL (ref 65–117)
GLUCOSE BLD STRIP.AUTO-MCNC: 181 MG/DL (ref 65–117)
GLUCOSE SERPL-MCNC: 133 MG/DL (ref 65–100)
HCT VFR BLD AUTO: 23.4 % (ref 36.6–50.3)
HGB BLD-MCNC: 7.3 G/DL (ref 12.1–17)
IMM GRANULOCYTES # BLD AUTO: 0 K/UL
IMM GRANULOCYTES NFR BLD AUTO: 0 %
INTERPRETATION: NORMAL
LYMPHOCYTES # BLD: 0.8 K/UL (ref 0.8–3.5)
LYMPHOCYTES NFR BLD: 11 % (ref 12–49)
MAGNESIUM SERPL-MCNC: 1.9 MG/DL (ref 1.6–2.4)
MCH RBC QN AUTO: 28.6 PG (ref 26–34)
MCHC RBC AUTO-ENTMCNC: 31.2 G/DL (ref 30–36.5)
MCV RBC AUTO: 91.8 FL (ref 80–99)
METAMYELOCYTES NFR BLD MANUAL: 4 %
MONOCYTES # BLD: 0.6 K/UL (ref 0–1)
MONOCYTES NFR BLD: 8 % (ref 5–13)
MYELOCYTES NFR BLD MANUAL: 9 %
NEUTS BAND NFR BLD MANUAL: 5 % (ref 0–6)
NEUTS SEG # BLD: 5 K/UL (ref 1.8–8)
NEUTS SEG NFR BLD: 63 % (ref 32–75)
NRBC # BLD: 0.51 K/UL (ref 0–0.01)
NRBC BLD-RTO: 7 PER 100 WBC
P SHIGELLOIDES DNA STL QL NAA+PROBE: NEGATIVE
PATH REV BLD -IMP: NORMAL
PLATELET # BLD AUTO: 110 K/UL (ref 150–400)
PMV BLD AUTO: 10.7 FL (ref 8.9–12.9)
POTASSIUM SERPL-SCNC: 4 MMOL/L (ref 3.5–5.1)
RBC # BLD AUTO: 2.55 M/UL (ref 4.1–5.7)
RBC MORPH BLD: ABNORMAL
SALMONELLA SPECIES, DNA: NEGATIVE
SERVICE CMNT-IMP: ABNORMAL
SHIGA TOXIN PRODUCING, DNA: NEGATIVE
SHIGELLA SP+EIEC IPAH STL QL NAA+PROBE: NEGATIVE
SODIUM SERPL-SCNC: 144 MMOL/L (ref 136–145)
VIBRIO SPECIES, DNA: NEGATIVE
WBC # BLD AUTO: 7.3 K/UL (ref 4.1–11.1)
Y. ENTEROCOLITICA, DNA: NEGATIVE

## 2022-02-17 PROCEDURE — G0378 HOSPITAL OBSERVATION PER HR: HCPCS

## 2022-02-17 PROCEDURE — 74011250636 HC RX REV CODE- 250/636: Performed by: INTERNAL MEDICINE

## 2022-02-17 PROCEDURE — 97116 GAIT TRAINING THERAPY: CPT

## 2022-02-17 PROCEDURE — 74011250637 HC RX REV CODE- 250/637: Performed by: INTERNAL MEDICINE

## 2022-02-17 PROCEDURE — 74011250636 HC RX REV CODE- 250/636: Performed by: NURSE PRACTITIONER

## 2022-02-17 PROCEDURE — 93306 TTE W/DOPPLER COMPLETE: CPT | Performed by: INTERNAL MEDICINE

## 2022-02-17 PROCEDURE — 74011636637 HC RX REV CODE- 636/637: Performed by: INTERNAL MEDICINE

## 2022-02-17 PROCEDURE — 82962 GLUCOSE BLOOD TEST: CPT

## 2022-02-17 PROCEDURE — 36415 COLL VENOUS BLD VENIPUNCTURE: CPT

## 2022-02-17 PROCEDURE — 74011000258 HC RX REV CODE- 258: Performed by: INTERNAL MEDICINE

## 2022-02-17 PROCEDURE — 93306 TTE W/DOPPLER COMPLETE: CPT

## 2022-02-17 PROCEDURE — 99223 1ST HOSP IP/OBS HIGH 75: CPT | Performed by: INTERNAL MEDICINE

## 2022-02-17 PROCEDURE — 65660000000 HC RM CCU STEPDOWN

## 2022-02-17 PROCEDURE — 97535 SELF CARE MNGMENT TRAINING: CPT

## 2022-02-17 PROCEDURE — 80048 BASIC METABOLIC PNL TOTAL CA: CPT

## 2022-02-17 PROCEDURE — 97530 THERAPEUTIC ACTIVITIES: CPT

## 2022-02-17 PROCEDURE — 96376 TX/PRO/DX INJ SAME DRUG ADON: CPT

## 2022-02-17 PROCEDURE — 74011250637 HC RX REV CODE- 250/637: Performed by: NURSE PRACTITIONER

## 2022-02-17 PROCEDURE — 83735 ASSAY OF MAGNESIUM: CPT

## 2022-02-17 PROCEDURE — 85025 COMPLETE CBC W/AUTO DIFF WBC: CPT

## 2022-02-17 RX ORDER — MAGNESIUM SULFATE HEPTAHYDRATE 40 MG/ML
2 INJECTION, SOLUTION INTRAVENOUS ONCE
Status: COMPLETED | OUTPATIENT
Start: 2022-02-17 | End: 2022-02-17

## 2022-02-17 RX ORDER — CALCIUM CARBONATE 200(500)MG
200 TABLET,CHEWABLE ORAL AS NEEDED
Status: DISCONTINUED | OUTPATIENT
Start: 2022-02-17 | End: 2022-02-20 | Stop reason: HOSPADM

## 2022-02-17 RX ADMIN — AMPICILLIN SODIUM AND SULBACTAM SODIUM 3 G: 2; 1 INJECTION, POWDER, FOR SOLUTION INTRAMUSCULAR; INTRAVENOUS at 19:02

## 2022-02-17 RX ADMIN — METRONIDAZOLE 500 MG: 500 INJECTION, SOLUTION INTRAVENOUS at 06:17

## 2022-02-17 RX ADMIN — POTASSIUM CHLORIDE AND SODIUM CHLORIDE: 900; 300 INJECTION, SOLUTION INTRAVENOUS at 16:59

## 2022-02-17 RX ADMIN — LOPERAMIDE HYDROCHLORIDE 2 MG: 2 CAPSULE ORAL at 15:34

## 2022-02-17 RX ADMIN — POTASSIUM CHLORIDE AND SODIUM CHLORIDE: 900; 300 INJECTION, SOLUTION INTRAVENOUS at 01:03

## 2022-02-17 RX ADMIN — CALCIUM CARBONATE 200 MG: 500 TABLET, CHEWABLE ORAL at 02:21

## 2022-02-17 RX ADMIN — ONDANSETRON 4 MG: 4 TABLET, ORALLY DISINTEGRATING ORAL at 12:53

## 2022-02-17 RX ADMIN — PREDNISONE 5 MG: 5 TABLET ORAL at 08:40

## 2022-02-17 RX ADMIN — MAGNESIUM SULFATE HEPTAHYDRATE 2 G: 40 INJECTION, SOLUTION INTRAVENOUS at 13:29

## 2022-02-17 RX ADMIN — AMPICILLIN SODIUM AND SULBACTAM SODIUM 3 G: 2; 1 INJECTION, POWDER, FOR SOLUTION INTRAMUSCULAR; INTRAVENOUS at 12:36

## 2022-02-17 RX ADMIN — ACETAMINOPHEN 650 MG: 325 TABLET ORAL at 01:03

## 2022-02-17 RX ADMIN — MEGESTROL ACETATE 400 MG: 40 SUSPENSION ORAL at 12:45

## 2022-02-17 RX ADMIN — FOLIC ACID 1 MG: 1 TABLET ORAL at 08:40

## 2022-02-17 NOTE — PROGRESS NOTES
[facility] at [address], [address] [facility] [address] [facility]  Maryjo Garciaer: [phone]  F: [phone] Patient ID  Name: Trino Wakefield  YOB: 1955  MRN: [id number]  Referring Provider:   No referring provider defined for this encounter. Primary Care Provider:   Other, MD Jorden       HEMATOLOGY/MEDICAL ONCOLOGY  NOTE   Date of Visit: 02/17/22  Reason for Evaluation:     Chief Complaint   Patient presents with    Hypotension       Subjective:     History of Present Illness:     Trino Wakefield is a 78 y.o. M who presents as an inpatient consultation for anemia in the setting of prostate cancer. Patient reports dealing with decreased appetite,nausea. Patient receives his oncology care in [place] where he lives. 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 This is a new problem. Problem has occurred for weeks. Problem has improved s/p RBC transfusion. 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 He is unaware of preceding anemia. He is s/p 2 cycles of treatment but cannot recall name of drug. Reports that he has received supportive medication for bone health (thinks either zometa or prolia). Denies any rbc transfusion after first cycle of therapy. Takes prednisone with his chemotherapy. Denies any prior chemotherapy. Had prostate cancer initially years ago. Reports that he has not taken any antiemetics but perhaps this medication may give him the confidence to be able to tolerate more solid diet. Has tried ensure but felt that he had diarrhea. Reports receiving octreotide for neuroendocrine tumor history (one intestinal lesion removed but has other unresectable disease). Interval History:     Still having diarrhea; states its the same; appetite remains down. Having some swelling to BLE. Continues with fatigue. In process of having ECHO. Daughter at bedside.  Requesting disc of scans done in the hospital to give oncologist in [facility] is to fly back to [place] on Sunday. Past Medical History:   Diagnosis Date    DM (diabetes mellitus) ([address]     GERD (gastroesophageal reflux disease)     HTN (hypertension)     Prostate cancer metastatic to bone (HCC)     Urinary incontinence       No past surgical history on file. Social History     Tobacco Use    Smoking status: Not on file    Smokeless tobacco: Not on file   Substance Use Topics    Alcohol use: Not on file      No family history on file.   Current Facility-Administered Medications   Medication Dose Route Frequency    calcium carbonate (TUMS) chewable tablet 200 mg [elemental]  200 mg Oral PRN    ampicillin-sulbactam (UNASYN) 3 g in 0.9% sodium chloride (MBP/ADV) 100 mL MBP  3 g IntraVENous Q6H    magnesium sulfate 2 g/50 ml IVPB (premix or compounded)  2 g IntraVENous ONCE    0.9% sodium chloride infusion 250 mL  250 mL IntraVENous PRN    megestroL (MEGACE) 400 mg/10 mL (40 mg/mL) oral suspension 400 mg  400 mg Oral DAILY    predniSONE (DELTASONE) tablet 5 mg  5 mg Oral DAILY    folic acid (FOLVITE) tablet 1 mg  1 mg Oral DAILY    insulin lispro (HUMALOG) injection   SubCUTAneous AC&HS    glucose chewable tablet 16 g  4 Tablet Oral PRN    dextrose (D50W) injection syrg 12.5-25 g  12.5-25 g IntraVENous PRN    glucagon (GLUCAGEN) injection 1 mg  1 mg IntraMUSCular PRN    loperamide (IMODIUM) capsule 2 mg  2 mg Oral Q4H PRN    0.9% sodium chloride infusion 250 mL  250 mL IntraVENous PRN    0.9% sodium chloride with KCl 40 mEq/L infusion   IntraVENous CONTINUOUS    acetaminophen (TYLENOL) tablet 650 mg  650 mg Oral Q6H PRN    Or    acetaminophen (TYLENOL) suppository 650 mg  650 mg Rectal Q6H PRN    polyethylene glycol (MIRALAX) packet 17 g  17 g Oral DAILY PRN    ondansetron (ZOFRAN ODT) tablet 4 mg  4 mg Oral Q8H PRN    Or    ondansetron (ZOFRAN) injection 4 mg  4 mg IntraVENous Q6H PRN      No Known Allergies           Objective: Visit Vitals  BP (!) 98/54 (BP 1 Location: Left upper arm, BP Patient Position: At rest)   Pulse 90   Temp 98.1 °F (36.7 °C)   Resp 18   Ht 5' 10.98\" (1.803 m)   Wt 86 kg (189 lb 9.5 oz)   SpO2 100%   BMI 26.46 kg/m²     ECOG PS: 2-3  General: no distress  Eyes: anicteric sclerae  HENT: atraumatic  Neck: supple  Respiratory: normal respiratory effort  CV: slight BLE peripheral edema  GI: soft, nontender, nondistended, no masses, no hepatomegaly, no splenomegaly  Skin: no rashes; no ecchymoses; no petechiae  Psych: alert, oriented, appropriate affect, normal judgment/insight    Results:     I personally reviewed Epic EHR labs/results below:   Lab Results   Component Value Date/Time    WBC 7.3 02/17/2022 12:26 AM    HGB 7.3 (L) 02/17/2022 12:26 AM    HCT 23.4 (L) 02/17/2022 12:26 AM    PLATELET 024 (L) 81/13/3676 12:26 AM    MCV 91.8 02/17/2022 12:26 AM    ABS. NEUTROPHILS 5.0 02/17/2022 12:26 AM     Lab Results   Component Value Date/Time    Sodium 144 02/17/2022 12:26 AM    Potassium 4.0 02/17/2022 12:26 AM    Chloride 118 (H) 02/17/2022 12:26 AM    CO2 19 (L) 02/17/2022 12:26 AM    Glucose 133 (H) 02/17/2022 12:26 AM    BUN 16 02/17/2022 12:26 AM    Creatinine 0.93 02/17/2022 12:26 AM    GFR est AA >60 02/17/2022 12:26 AM    GFR est non-AA >60 02/17/2022 12:26 AM    Calcium 6.5 (L) 02/17/2022 12:26 AM    Glucose (POC) 130 (H) 02/17/2022 12:02 PM     Lab Results   Component Value Date/Time    Bilirubin, total 1.1 (H) 02/16/2022 12:15 AM    ALT (SGPT) 22 02/16/2022 12:15 AM    Alk. phosphatase 123 (H) 02/16/2022 12:15 AM    Protein, total 5.1 (L) 02/16/2022 12:15 AM    Albumin 2.4 (L) 02/16/2022 12:15 AM    Globulin 2.7 02/16/2022 12:15 AM         Assessment and Recommendations:     Anemia due to antineoplastic chemotherapy s/p 2 unit PRBCs)   -Improved status post red blood cell transfusions. Folate repletion could be helpful to improve his anemia. Most consistent with chemotherapy-induced anemia. --continue to monitor  CBC and transfuse for Hgb < 7    Chemotherapy-induced nausea  -Encouraged patient to take antiemetics while hospitalized and at home as needed  to improve his nausea and oral intake. Patient is agreeable. Prostate cancer metastatic to bone [facility])  -Patient with reported bone disease.  -He will need to be evaluated by his hematology oncology provider prior to him receiving further chemotherapy. Pt is scheduled to see provider on Monday 2/21/22     Anorexia   -We reviewed importance of patient maintaining a diet while on chemotherapy. We discussed that likely his poor oral intake is the root of several of his problems.  -dietician following     Folate deficiency  -Replete with folate 1000 mcg daily. Loose stools   - He does have a history of neuroendocrine tumors that he reportedly is receiving active therapy with octreotide. He denies missing any treatment doses making neuroendocrine induced diarrhea less likely at this time.  -stool studies: C-Diff negative :   O&P  And Enteric panel pending     Gout flare: ortho consulted; tx with steroids    Follow-Up:  will need to establish follow-up with his hematology/oncology provider in [place] upon his return (reportedly is schedule for Cycle 3 chemotherapy next week on Monday 2/21/22).     Plan reviewed with Dr Zeferino Barton    Signed By:   Neto Lowe NP

## 2022-02-17 NOTE — CONSULTS
[facility] [place] [id number]  [address] [facility], [address] [place] [address] Manner: [phone]  F: [phone] Patient ID  Name: Michel Lubin  YOB: 1955  MRN: [id number]  Referring Provider:   No referring provider defined for this encounter. Primary Care Provider:   Other, MD Jorden       HEMATOLOGY/MEDICAL ONCOLOGY  NOTE   Date of Visit: 02/16/22  Reason for Evaluation:     Chief Complaint   Patient presents with    Hypotension       Subjective:     History of Present Illness:     Michel Single is a 78 y.o. M who presents as an inpatient consultation for anemia in the setting of prostate cancer. Patient reports dealing with decreased appetite,nausea. Patient receives his oncology care in [place] where he lives. 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 This is a new problem. Problem has occurred for weeks. Problem has improved s/p RBC transfusion. 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 He is unaware of preceding anemia. He is s/p 2 cycles of treatment but cannot recall name of drug. Reports that he has received supportive medication for bone health (thinks either zometa or prolia). Denies any rbc transfusion after first cycle of therapy. Takes prednisone with his chemotherapy. Denies any prior chemotherapy. Had prostate cancer initially years ago. Reports that he has not taken any antiemetics but perhaps this medication may give him the confidence to be able to tolerate more solid diet. Has tried ensure but felt that he had diarrhea. Reports receiving octreotide for neuroendocrine tumor history (one intestinal lesion removed but has other unresectable disease).     Past Medical History:   Diagnosis Date    DM (diabetes mellitus) ([facility] [address]     GERD (gastroesophageal reflux disease)     HTN (hypertension)     Prostate cancer metastatic to bone (HCC)     Urinary incontinence       No past surgical history on file. Social History     Tobacco Use    Smoking status: Not on file    Smokeless tobacco: Not on file   Substance Use Topics    Alcohol use: Not on file      No family history on file.   Current Facility-Administered Medications   Medication Dose Route Frequency    0.9% sodium chloride infusion 250 mL  250 mL IntraVENous PRN    [START ON 2/17/2022] megestroL (MEGACE) 400 mg/10 mL (40 mg/mL) oral suspension 400 mg  400 mg Oral DAILY    [START ON 2/17/2022] predniSONE (DELTASONE) tablet 5 mg  5 mg Oral DAILY    folic acid (FOLVITE) tablet 1 mg  1 mg Oral DAILY    insulin lispro (HUMALOG) injection   SubCUTAneous AC&HS    glucose chewable tablet 16 g  4 Tablet Oral PRN    dextrose (D50W) injection syrg 12.5-25 g  12.5-25 g IntraVENous PRN    glucagon (GLUCAGEN) injection 1 mg  1 mg IntraMUSCular PRN    loperamide (IMODIUM) capsule 2 mg  2 mg Oral Q4H PRN    cefTRIAXone (ROCEPHIN) 1 g in 0.9% sodium chloride 10 mL IV syringe  1 g IntraVENous Q24H    metroNIDAZOLE (FLAGYL) IVPB premix 500 mg  500 mg IntraVENous Q12H    0.9% sodium chloride infusion 250 mL  250 mL IntraVENous PRN    0.9% sodium chloride with KCl 40 mEq/L infusion   IntraVENous CONTINUOUS    acetaminophen (TYLENOL) tablet 650 mg  650 mg Oral Q6H PRN    Or    acetaminophen (TYLENOL) suppository 650 mg  650 mg Rectal Q6H PRN    polyethylene glycol (MIRALAX) packet 17 g  17 g Oral DAILY PRN    ondansetron (ZOFRAN ODT) tablet 4 mg  4 mg Oral Q8H PRN    Or    ondansetron (ZOFRAN) injection 4 mg  4 mg IntraVENous Q6H PRN      No Known Allergies     Review of Systems provided by:patient  General: denies fever, reports appetite loss and reports fatigue  Eyes: denies any acute vision loss and denies any eye pain  HEENT: denies epistaxis, denies trouble swallowing and denies oral mucositis  Cardio: denies any chest pain and denies any leg swelling  Resp: denies any shortness of breath and denies any cough  Abdomen: denies any abdominal pain, denies any vomiting, reports nausea and reports diarrhea  MSK: denies any myalgias and denies any arthralgias  Skin: denies any rash and denies any itching  Lymph: denies any lymph node enlargement and denies any lymph node tenderness  Neuro: denies any headache and denies any tremor  : Denies any dysuria. Denies any hematuria. Psych: denies depression and denies anxiety      Objective:     Visit Vitals  /72 (BP 1 Location: Left upper arm, BP Patient Position: At rest)   Pulse 92   Temp 99.7 °F (37.6 °C)   Resp 18   Ht 5' 11\" (1.803 m)   Wt 189 lb 9.5 oz (86 kg)   SpO2 99%   BMI 26.44 kg/m²     ECOG PS: 3- Capable of only limited selfcare; confined to bed or chair more than 50% of waking hours. Physical Exam  Constitutional: No acute distress. , Non-toxic appearance. and Non-diaphoretic. HENT: Normocephalic and atraumatic head. Eyes: Normal Conjunctivae. Anicteric sclerae. Cardiovascular: S1,S2 auscultated. No pitting edema. No friction rub. No gallops auscultated. Pulmonary: Normal Respiratory Effort. No wheezing. No rhonchi. No rales. Abdominal: Normal bowel sounds. Soft Abdomen to palpation. No abdominal tenderness. No guarding. Skin: No rash. Musculoskeletal: No muscle pain on palpation. No temporal muscle wasting on inspection. Neurological: Alert and oriented. No tremor on inspection. Psychiatric: mood normal. normal speech rate normal affect    Results:     I personally reviewed Epic EHR labs/results below:   Lab Results   Component Value Date/Time    WBC 6.2 02/16/2022 12:15 AM    HGB 7.4 (L) 02/16/2022 03:21 PM    HCT 23.5 (L) 02/16/2022 03:21 PM    PLATELET 040 (L) 50/01/6880 12:15 AM    MCV 93.4 02/16/2022 12:15 AM    ABS.  NEUTROPHILS 5.6 02/15/2022 10:00 PM     Lab Results   Component Value Date/Time    Sodium 145 02/16/2022 12:15 AM    Potassium 3.6 02/16/2022 11:18 AM    Chloride 117 (H) 02/16/2022 12:15 AM    CO2 19 (L) 02/16/2022 12:15 AM    Glucose 148 (H) 02/16/2022 12:15 AM    BUN 14 02/16/2022 12:15 AM    Creatinine 0.79 02/16/2022 12:15 AM    GFR est AA >60 02/16/2022 12:15 AM    GFR est non-AA >60 02/16/2022 12:15 AM    Calcium 5.5 (LL) 02/16/2022 12:15 AM    Glucose (POC) 115 02/16/2022 08:57 PM     Lab Results   Component Value Date/Time    Bilirubin, total 1.1 (H) 02/16/2022 12:15 AM    ALT (SGPT) 22 02/16/2022 12:15 AM    Alk. phosphatase 123 (H) 02/16/2022 12:15 AM    Protein, total 5.1 (L) 02/16/2022 12:15 AM    Albumin 2.4 (L) 02/16/2022 12:15 AM    Globulin 2.7 02/16/2022 12:15 AM         Assessment and Recommendations:     Anemia due to antineoplastic chemotherapy  -Improved status post red blood cell transfusions. Discussed with patient that he will need to follow-up with his primary oncologist to discuss his hospitalization and severe anemia. Surely folate repletion could be helpful to improve his anemia. However, discussed with patient that it seems that his anemia is most consistent with chemotherapy-induced anemia. We discussed that I cannot exclude an underlying bone marrow disorder such as myelodysplastic syndrome. I also discussed that I cannot exclude patient has prostate cancer involvement in his bone marrow (also known as myelopthistic disease) that could complicate his anemia in the early stages of his treatment though improve over time. We also discussed that at some point it may be a consideration for him to have a bone marrow biopsy if his anemia does not improve. We discussed that his oncology provider at some point may consider EPO stimulating agents to help patient reduce need for red blood cell transfusion during his chemotherapy treatments in a palliative setting. However, we did discuss that EPO agents can help cancer grow.   -For now, continue to follow hemoglobin to ensure further improvement stabilization.  -While I cannot exclude any occult GI blood loss, it seems that patient is improving in his anemia that chemo induced anemia is the leading component of his anemia and further chemotherapy dosing and treatment decisions will need to be weighed carefully by his oncology provider.  -Keep hemoglobin above 7g/dL with red blood cell transfusions. Chemotherapy-induced nausea  -Encouraged patient to take antiemetics while hospitalized to improve his nausea and oral intake. Patient is agreeable. -Answered patient and daughter's questions at the bedside.  -We discussed that he has as needed antiemetics and that he would simply need to request them from nursing. We discussed for him not to take his own home medication while hospitalized. Prostate cancer metastatic to bone [facility])  -Patient with reported bone disease.  -He will need to be evaluated by his hematology oncology provider prior to him receiving further chemotherapy. Anorexia   -We reviewed importance of patient maintaining a diet while on chemotherapy. We discussed that likely his poor oral intake is the root of several of his problems. Folate deficiency  -Replete with folate 1000 mcg daily. Loose stools   -GI consult present when we saw patient. We discussed that simply his lack of a solid diet could be a contributing factor. He does have a history of neuroendocrine tumors that he reportedly is receiving active therapy with octreotide. He denies missing any treatment doses making neuroendocrine induced diarrhea less likely at this time. He is without fever complaints so upfront less concerning for C. difficile colitis although cannot exclude fully. We discussed the potential utility of probiotics. Follow-Up:  Will likely briefly follow-up tomorrow but patient surely will need to establish follow-up with his hematology/oncology provider in [place] upon his return (reportedly is schedule for Cycle 3 chemotherapy next week).     Signed By:   Suzie Gambino MD

## 2022-02-17 NOTE — PROGRESS NOTES
Cardiology Initial Care Encounter    Patient: Kristine Lima MRN: [id number]     YOB: 1955  Age: 78 y.o. Sex: male      Admit Date: 2/15/2022       Assessment/Plan     1. VT: 12-beat run yesterday evening, HR also elevated at 1 time w/ activity to 150's - appeared ST. Replete lytes/hydrate d/t diarrhea. Check TTE since pt w/ some CIFUENTES, pBNP elevated to 3805. Pt is from [place] and will return on d/c so will hold on 30 day event monitor    2. Fever unknown origin: having episodes of diarrhea, lactate elevated 2/16. On abx - ID following     3. HTN: not on meds since he lost sig amount of weight, BP low     4. Hx of metastatic prostate CA: on chemo, heme/onc following    5. Anemia/thrombocytopenia: likely d/t chemo. Occult stool neg            Agree with advanced NP's history, exam and  A/P with changes/additons. Blood pressure 104/62, pulse 91, temperature 99.8 °F (37.7 °C), resp. rate 19, height 5' 11\" (1.803 m), weight 189 lb (85.7 kg), SpO2 100 %. CVS-S1-S2 present   2/6 systolic murmur present  RS-   CTAB        Abdomen-  soft/NT      A/P :  NSVT - asymptomatic - check and replete lytes as needed. Avoid QT prolonging meds  ECHO/ continue Tele    Metastatic Prostate Ca          [facility]. [place] WHITEse is a 78 y.o.  male  with PMH significant for prostate CA, HTN, anemia and GERD. Pt is here visiting family from [place]. He prsented to the ED on 2/15/22 with complaints of acute weakness, decreased appetite, dizziness, and exertional shortness of breath and fatigue. He is also having episodes of loose stools/diarrhea. He states he is currently getting infusions of his chemotherapeutic every 3 weeks, and most recently had a about 14 days ago. Yesterday evening noted to have elevated HR w/ activity, then 12-beat run of VT. He was not aware of any change in his HR at the time.  He denies any cardiac history other than HTN, has never seen a cardiologist. He denies any CP, palpitations, orthopnea or edema. ECHO ordered    The patient has been referred to cardiology for on going management of VT. Review of Symptoms:  Constitutional:  +fatigue, weakness  ENT: negative   Respiratory:  +CIFUENTES  Gastrointestinal: +diarrhea  Genitourinary: no dysuria, hematuria, frequency   Musculoskeletal:negative  Neurological: negative  Other systems reviewed and negative except as above. Previous cardiac hx  No specialty comments available. Risk factors: HTN    Social History     Tobacco Use    Smoking status: Not on file    Smokeless tobacco: Not on file   Substance Use Topics    Alcohol use: Not on file     No family history on file.     Current Facility-Administered Medications   Medication Dose Route Frequency    calcium carbonate (TUMS) chewable tablet 200 mg [elemental]  200 mg Oral PRN    ampicillin-sulbactam (UNASYN) 3 g in 0.9% sodium chloride (MBP/ADV) 100 mL MBP  3 g IntraVENous Q6H    magnesium sulfate 2 g/50 ml IVPB (premix or compounded)  2 g IntraVENous ONCE    0.9% sodium chloride infusion 250 mL  250 mL IntraVENous PRN    megestroL (MEGACE) 400 mg/10 mL (40 mg/mL) oral suspension 400 mg  400 mg Oral DAILY    predniSONE (DELTASONE) tablet 5 mg  5 mg Oral DAILY    folic acid (FOLVITE) tablet 1 mg  1 mg Oral DAILY    insulin lispro (HUMALOG) injection   SubCUTAneous AC&HS    glucose chewable tablet 16 g  4 Tablet Oral PRN    dextrose (D50W) injection syrg 12.5-25 g  12.5-25 g IntraVENous PRN    glucagon (GLUCAGEN) injection 1 mg  1 mg IntraMUSCular PRN    loperamide (IMODIUM) capsule 2 mg  2 mg Oral Q4H PRN    0.9% sodium chloride infusion 250 mL  250 mL IntraVENous PRN    0.9% sodium chloride with KCl 40 mEq/L infusion   IntraVENous CONTINUOUS    acetaminophen (TYLENOL) tablet 650 mg  650 mg Oral Q6H PRN    Or    acetaminophen (TYLENOL) suppository 650 mg  650 mg Rectal Q6H PRN    polyethylene glycol (MIRALAX) packet 17 g  17 g Oral DAILY PRN    ondansetron (ZOFRAN ODT) tablet 4 mg  4 mg Oral Q8H PRN    Or    ondansetron (ZOFRAN) injection 4 mg  4 mg IntraVENous Q6H PRN       Objective:     Vitals:    02/17/22 0459 02/17/22 0700 02/17/22 0831 02/17/22 0937   BP: 100/66  121/70    Pulse: 88 89 99    Resp: 18  18    Temp: 98.7 °F (37.1 °C)  98.7 °F (37.1 °C)    SpO2: 97%  99%    Weight:       Height:    5' 10.98\" (1.803 m)        Intake and Output:  Current Shift: No intake/output data recorded. Last three shifts: 02/15 1901 - 02/17 0700  In: 2709.4 [P.O.:640; I.V.:1150]  Out: 400 [Urine:400]          Gen: Well-developed, well-nourished, in no acute distress  Neck: Supple,No JVD, No Carotid Bruit,   Resp: No accessory muscle use, Clear breath sounds, No rales or rhonchi  Card: Regular Rate,Rythm,Normal S1, S2, No murmurs, rubs or gallop. No thrills. Abd:  Soft, non-tender, non-distended,BS+,   MSK: No cyanosis  Skin: No rashes    Neuro: moving all four extremities , follows commands appropriately  Psych:  Good insight, oriented to person, place , alert, Nml Affect  LE: No edema    EKG: normal EKG, normal sinus rhythm, nonspecific ST and T waves changes. TELEMETRY SR     Lab/Data Review: All lab results for the last 24 hours reviewed.      Signed By: Eneida Rios NP     February 17, 2022

## 2022-02-17 NOTE — PROGRESS NOTES
2/17/2022  CARE MANAGEMENT NOTE:  CM reviewed EMR for clinical updates and handoff received from previous  Lilly Fraire). Pt was admitted with anemia; also with metastatic prostate cancer to the bone. Reportedly, pt lives with his wife and two sons in [place]. [place] are visiting family in [place]    Transition Plan of Care:  1. Urology, ID, GI, Hem/onc following for medical management  2. PT/OT evals complete; pt ambulated 25 feet on 2/16 and no further rehab services indicated   3. Plan is for pt to return to [place]  4. Outpt f/u in [place]  5. Pt will arrange his own transportation home    CM will continue to follow pt until discharged.   Tona

## 2022-02-17 NOTE — PROGRESS NOTES
Problem: Mobility Impaired (Adult and Pediatric)  Goal: *Acute Goals and Plan of Care (Insert Text)  Description: FUNCTIONAL STATUS PRIOR TO ADMISSION: Patient was modified independent using a single point cane for functional mobility. HOME SUPPORT PRIOR TO ADMISSION: The patient lived with single point cane but did not require assist.    Physical Therapy Goals  Initiated 2/16/2022  1. Patient will move from supine to sit and sit to supine , scoot up and down, and roll side to side in bed with independence within 7 day(s). 2.  Patient will transfer from bed to chair and chair to bed with modified independence using the least restrictive device within 7 day(s). 3.  Patient will perform sit to stand with modified independence within 7 day(s). 4.  Patient will ambulate with modified independence for 200 feet with the least restrictive device within 7 day(s). 5.  Patient will ascend/descend 4 stairs with  handrail(s) with modified independence within 7 day(s). Outcome: Progressing Towards Goal   PHYSICAL THERAPY TREATMENT  Patient: Jeff Aquino (35 y.o. male)  Date: 2/17/2022  Diagnosis: Anemia [D64.9] Anemia due to antineoplastic chemotherapy       Precautions:    Chart, physical therapy assessment, plan of care and goals were reviewed. ASSESSMENT  Patient continues with skilled PT services and is slowly progressing towards goals. Patient initially hesitant to work with therapy, education provided regarding benefit of being OOB (initially declining due to daughter needing chair but two chairs present), eventually agreeable. Patient demonstrating significant increase in assistance needed for transfers today, even with increased support of RW vs SPC used yesterday. Patient unsafe with RW with standing, with initial large lateral lean and MOD A needed from PT to achieve standing from bed. Gait notably antalgic with ankle held in DF, able to correct with VCs, likely held that way 2/2 pain.   Needed frequent cues for safe gait with RW, improved transfer off toilet and then to sit in chair at conclusion. Recommend more practice with RW prior to d/c home to ensure safety. Also note HR up to 140s with first gait from bed to toilet, in 110s standing to perform UB washing at sink. RW order placed for patient. Current Level of Function Impacting Discharge (mobility/balance): MIN to MOD A to achieve standing with RW, gait in room with RW with CGA to MIN A    Other factors to consider for discharge: lives in [place], will need assitance at d/c         PLAN :  Patient continues to benefit from skilled intervention to address the above impairments. Continue treatment per established plan of care. to address goals. Recommendation for discharge: (in order for the patient to meet his/her long term goals)  Physical therapy at least 2 days/week in the home     This discharge recommendation:  Has been made in collaboration with the attending provider and/or case management    IF patient discharges home will need the following DME: rolling walker       SUBJECTIVE:   Patient stated This toe is awful today.     OBJECTIVE DATA SUMMARY:   Critical Behavior:  Neurologic State: Alert  Orientation Level: Oriented X4  Cognition: Follows commands  Safety/Judgement: Decreased awareness of need for assistance,Decreased awareness of need for safety,Fall prevention  Functional Mobility Training:  Bed Mobility:  Rolling: Modified independent  Supine to Sit: Modified independent  Sit to Supine: Modified independent  Scooting: Modified independent        Transfers:  Sit to Stand: Minimum assistance; Moderate assistance  Stand to Sit: Minimum assistance; Moderate assistance                             Balance:  Sitting: Intact  Standing: Impaired; With support  Standing - Static: Constant support  Standing - Dynamic : Constant support  Ambulation/Gait Training:  Distance (ft): 20 Feet (ft) (x 2)  Assistive Device: Gait belt;Walker, rolling  Ambulation - Level of Assistance: Contact guard assistance;Minimal assistance (MIN for occasional RW assistance)        Gait Abnormalities: Antalgic;Decreased step clearance (foot drop on R but can correct with cues)        Base of Support: Widened     Speed/Kaitlin: Slow  Step Length: Right shortened;Left shortened                    Pain Ratin/10 in ankle/toe    Activity Tolerance:   Fair    After treatment patient left in no apparent distress:   Sitting in chair, Call bell within reach, and Caregiver / family present    COMMUNICATION/COLLABORATION:   The patients plan of care was discussed with: Occupational therapist and Registered nurse.      Xavier Ceballos, PT   Time Calculation: 33 mins

## 2022-02-17 NOTE — PROGRESS NOTES
Problem: Self Care Deficits Care Plan (Adult)  Goal: *Acute Goals and Plan of Care (Insert Text)  Description: FUNCTIONAL STATUS PRIOR TO ADMISSION: Patient was modified independent using a single point cane for functional mobility. Patient was independent for basic and instrumental ADLs. Pt drives and completes IADLs at baseline. HOME SUPPORT: The patient lived with wife and 2 sons but did not require assist. Pt is from [place] but is here in [place] visiting his dtr. Occupational Therapy Goals  Initiated 2/16/2022  1. Patient will perform grooming, standing at sink, with modified independence within 7 day(s). 2.  Patient will perform lower body dressing with supervision/set-up within 7 day(s). 3.  Patient will perform bathing, sitting and standing PRN, with modified independence within 7 day(s). 4.  Patient will perform toilet transfers and all aspects of toileting with modified independence within 7 day(s). Outcome: Progressing Towards Goal     OCCUPATIONAL THERAPY TREATMENT  Patient: Gia Wooten (11 y.o. male)  Date: 2/17/2022  Diagnosis: Anemia [D64.9] Anemia due to antineoplastic chemotherapy       Precautions:    Chart, occupational therapy assessment, plan of care, and goals were reviewed. ASSESSMENT  Patient continues with skilled OT services and is slowly progressing towards goals. Patient today is received up in bathroom with PT completing toileting tasks. He is able to manage standing up from commode with CGA/min A and then performs serial bathing/grooming tasks in standing at sink. Noted pt still with slow pace and increased difficulty moving R LE during mobility in bathroom even with RW use, and he benefits from cues for improved safety with RW throughout session. HR ranged to high 110s with standing ADLs this session. He is agreeable to sit up OOB in chair at conclusion and is receptive to all education.       Current Level of Function Impacting Discharge (ADLs): up to min A for ADLs and ADL transfers    Other factors to consider for discharge: from [place]; fall risk; pain and swelling in R LE (gout)         PLAN :  Patient continues to benefit from skilled intervention to address the above impairments. Continue treatment per established plan of care to address goals. Recommend with staff: mobility to bathroom for toileting; ADLs with assistance; OOB to chair for all meals (LEs elevated)    Recommend next OT session: continue towards set OT goals    Recommendation for discharge: (in order for the patient to meet his/her long term goals)  Occupational therapy at least 2 days/week in the home vs none    This discharge recommendation:  Has been made in collaboration with the attending provider and/or case management    IF patient discharges home will need the following DME: RW       SUBJECTIVE:   Patient stated It feels good to wash up.     OBJECTIVE DATA SUMMARY:   Cognitive/Behavioral Status:  Neurologic State: Alert  Orientation Level: Oriented X4  Cognition: Follows commands  Perception: Appears intact  Perseveration: No perseveration noted  Safety/Judgement: Decreased awareness of need for assistance;Decreased awareness of need for safety; Fall prevention    Functional Mobility and Transfers for ADLs:  Bed Mobility:  Pt received OOB    Transfers:  Functional Transfers  Bathroom Mobility: Contact guard assistance  Toilet Transfer : Contact guard assistance;Minimum assistance    Balance:  Sitting: Intact  Standing: Impaired; With support  Standing - Static: Constant support  Standing - Dynamic : Constant support    ADL Intervention:  Grooming  Grooming Assistance: Stand-by assistance;Contact guard assistance  Position Performed: Standing (at sink with RW)  Washing Face: Stand-by assistance;Contact guard assistance  Washing Hands: Stand-by assistance    Upper Body Bathing  Bathing Assistance: Stand-by assistance;Contact guard assistance  Position Performed: Standing (at sink with RW)  Cues: Verbal cues provided    Lower Body Bathing  Perineal  : Contact guard assistance;Minimum assistance (for stability)  Position Performed: Standing (with RW)  Cues: Physical assistance pants down;Physical assistance pants up; Tactile cues provided;Verbal cues provided;Visual cues provided  Adaptive Equipment: Walker    Upper Body [address]: Minimum  assistance (due to multiple lines and tele box)    Toileting  Bladder Hygiene: Modified independent  Clothing Management: Minimum assistance  Cues: Verbal cues provided  Adaptive Equipment: Grab bars    Cognitive Retraining  Safety/Judgement: Decreased awareness of need for assistance;Decreased awareness of need for safety; Fall prevention    Pain:  Pt reporting pain from gout in R LE    Activity Tolerance:   Fair    After treatment patient left in no apparent distress:   Sitting in chair and Call bell within reach    COMMUNICATION/COLLABORATION:   The patients plan of care was discussed with: Physical therapist and Registered nurse.      Ck Loaiza OT  Time Calculation: 24 mins

## 2022-02-17 NOTE — PROGRESS NOTES
2/17/2022   3:46 PM  [facility] accepted and RW delivered to patient. Ticket uploaded in [address]. 2:08 PM  CM noted consult for RW. Spoke with patient over the phone who requested referral sent to [facility]. Referral sent via [employer], awaiting response.     Ra Lazcano, WENDY

## 2022-02-17 NOTE — CONSULTS
Comprehensive Nutrition Assessment    Type and Reason for Visit: Initial,Consult    Nutrition Recommendations/Plan:   1. Continue regular, vegetarian diet as tolerated  2. Daughter to provide Orgain Protein supplement as tolerated  3. Provide Gelatein once daily to increase kcal/protein intake (80 kcal, <1 g carbs, 20 g protein)     Nutrition Assessment:    Pt is a 78year old male admitted with Anemia [D64.9]. He  has a past medical history of DM (diabetes mellitus) ([address] GERD (gastroesophageal reflux disease), HTN (hypertension), Prostate cancer metastatic to bone [facility]), and Urinary incontinence. Lack of weight history. Patient states # - endorses weight loss of 91# within last seven months (48%), clinically significant for timeframe. Consulted for \"takes Ensures\". Patient drank an ensure last night and had diarrhea. Patient states he has been unable to tolerate PO and has not had an appetite since starting chemo ~ 6 months ago. Endorses constant N/V/D. Daughter at bedside stating that patient has been unable to tolerate meats during chemotherapy and became vegetarian during this time. Has been having diarrhea with most supplement intake - have not noticed if it is all dairy intake. Voiced acceptance of Gelatein once daily to trial, in addition to Orgains 1 to 3 times/day (working on building tolerance - enjoyed today without negative bowel consequences). Provided menu - daughter will aid in ordering meals.      Patient Vitals for the past 168 hrs:   % Diet Eaten   02/16/22 1944 0%   02/16/22 1500 1 - 25%   02/16/22 1143 1 - 25%       Wt Readings from Last 10 Encounters:   02/16/22 86 kg (189 lb 9.5 oz)       Malnutrition Assessment:  Malnutrition Status:  Severe malnutrition    Context:  Chronic illness     Findings of the 6 clinical characteristics of malnutrition:   Energy Intake:  7 - 75% or less est energy requirements for 1 month or longer  Weight Loss:  7.00 - Greater than 10% over 6 months     Body Fat Loss:  1 - Mild body fat loss, Triceps   Muscle Mass Loss:  No significant muscle mass loss,    Fluid Accumulation:  No significant fluid accumulation,     Strength:  Not performed     Estimated Daily Nutrient Needs:  Energy (kcal): 8867-7112 (25-30); Weight Used for Energy Requirements: Current  Protein (g): 103-129 (1.2-1.5); Weight Used for Protein Requirements: Current  Fluid (ml/day): 3785-2278; Method Used for Fluid Requirements: 1 ml/kcal    Nutrition Related Findings:    ABD: Poor appetite with hyperactive bowel sounds   Last BM: 2/16, loose  Edema: LLE: Non-pitting (2/16/2022  8:23 PM)  RLE: Non-pitting (2/16/2022  8:23 PM)    Nutr. Labs:  Lab Results   Component Value Date/Time    GFR est AA >60 02/17/2022 12:26 AM    GFR est non-AA >60 02/17/2022 12:26 AM    Creatinine 0.93 02/17/2022 12:26 AM    BUN 16 02/17/2022 12:26 AM    Sodium 144 02/17/2022 12:26 AM    Potassium 4.0 02/17/2022 12:26 AM    Chloride 118 (H) 02/17/2022 12:26 AM    CO2 19 (L) 02/17/2022 12:26 AM     Lab Results   Component Value Date/Time    Glucose 133 (H) 02/17/2022 12:26 AM    Glucose (POC) 153 (H) 02/17/2022 07:21 AM     No results found for: HBA1C, FNA9KMPK, TSL8MQTR, ZGI8VLET    Nutr. Meds:  NaCl with KCl, TUMS PRN, folic acid, humalog, megace, flagyl, zofran PRN, miralax PRN, deltasone     Wounds:    None       Current Nutrition Therapies:  DIET ONE TIME MESSAGE  ADULT DIET Regular; Vegetarian (Lacto-Ovo)    Anthropometric Measures:  · Height:  5' 10.98\" (180.3 cm)  · Current Body Wt:  86 kg (189 lb 9.5 oz)   · Admission Body Wt:  189 lb 9.5 oz    · Usual Body Wt:  127 kg (280 lb)     · Ideal Body Wt:  172 lbs:  110.2 %   · Body mass index is 26.46 kg/m². · BMI Category: Overweight (BMI 25.0-29. 9)       Nutrition Diagnosis:   · Severe malnutrition related to inadequate protein-energy intake,catabolic illness as evidenced by weight loss,intake 0-25%,poor intake prior to admission    Nutrition Interventions:   Food and/or Nutrient Delivery: Continue current diet,Start oral nutrition supplement  Nutrition Education and Counseling: No recommendations at this time  Coordination of Nutrition Care: Continue to monitor while inpatient,Interdisciplinary rounds    Goals:  PO intake >/= 75% of estimated protein needs within 3 - 5 days       Nutrition Monitoring and Evaluation:   Behavioral-Environmental Outcomes: None identified  Food/Nutrient Intake Outcomes: Food and nutrient intake,Supplement intake  Physical Signs/Symptoms Outcomes: Biochemical data,Skin,Weight,Nausea/vomiting,Diarrhea    Discharge Planning:     Too soon to determine     Electronically signed by Dorota Arteaga RD on 5/53/6242   Contact: [phone] or via [employer]

## 2022-02-17 NOTE — PROGRESS NOTES
[facility]  [address]  [phone]      Medical Progress Note      NAME: Logan Orozco   :  1955  MRM:  [id number]    Date/Time of service: 2022         Subjective:     Chief Complaint:  Patient was personally seen and examined by me during this time period. Chart reviewed. F/u anemia. vT overnight with an episode of HR in the 150s. Reports no overt bleeding, some light headedness. Objective:       Vitals:       Last 24hrs VS reviewed since prior progress note.  Most recent are:    Visit Vitals  /62 (BP 1 Location: Left upper arm, BP Patient Position: At rest)   Pulse 91   Temp 99.8 °F (37.7 °C)   Resp 19   Ht 5' 11\" (1.803 m)   Wt 85.7 kg (189 lb)   SpO2 100%   BMI 26.36 kg/m²     SpO2 Readings from Last 6 Encounters:   22 100%            Intake/Output Summary (Last 24 hours) at 2022 1833  Last data filed at 2022 1559  Gross per 24 hour   Intake 3163.33 ml   Output 530 ml   Net 2633.33 ml        Exam:     Physical Exam:    Gen:   in no acute distress  HEENT:  Pink conjunctivae, PERRL, hearing intact to voice  Resp:  No accessory muscle use, clear breath sounds without wheezes rales or rhonchi  Card:  RRR, No murmurs, normal S1, S2, no peripheral edema  Abd:  Soft, non-tender, non-distended, normoactive bowel sounds are present  Musc:  No cyanosis or clubbing  Skin:  No rashes or ulcers, skin turgor is good  Neuro:  Cranial nerves 3-12 are grossly intact, follows commands appropriately  Psych:  Oriented to person, place, and time, Alert with good insight      Medications Reviewed: (see below)    Lab Data Reviewed: (see below)    ______________________________________________________________________    Medications:     Current Facility-Administered Medications   Medication Dose Route Frequency    calcium carbonate (TUMS) chewable tablet 200 mg [elemental]  200 mg Oral PRN    ampicillin-sulbactam (UNASYN) 3 g in 0.9% sodium chloride (MBP/ADV) 100 mL MBP  3 g IntraVENous Q6H    0.9% sodium chloride infusion 250 mL  250 mL IntraVENous PRN    megestroL (MEGACE) 400 mg/10 mL (40 mg/mL) oral suspension 400 mg  400 mg Oral DAILY    predniSONE (DELTASONE) tablet 5 mg  5 mg Oral DAILY    folic acid (FOLVITE) tablet 1 mg  1 mg Oral DAILY    insulin lispro (HUMALOG) injection   SubCUTAneous AC&HS    glucose chewable tablet 16 g  4 Tablet Oral PRN    dextrose (D50W) injection syrg 12.5-25 g  12.5-25 g IntraVENous PRN    glucagon (GLUCAGEN) injection 1 mg  1 mg IntraMUSCular PRN    loperamide (IMODIUM) capsule 2 mg  2 mg Oral Q4H PRN    0.9% sodium chloride infusion 250 mL  250 mL IntraVENous PRN    0.9% sodium chloride with KCl 40 mEq/L infusion   IntraVENous CONTINUOUS    acetaminophen (TYLENOL) tablet 650 mg  650 mg Oral Q6H PRN    Or    acetaminophen (TYLENOL) suppository 650 mg  650 mg Rectal Q6H PRN    polyethylene glycol (MIRALAX) packet 17 g  17 g Oral DAILY PRN    ondansetron (ZOFRAN ODT) tablet 4 mg  4 mg Oral Q8H PRN    Or    ondansetron (ZOFRAN) injection 4 mg  4 mg IntraVENous Q6H PRN          Lab Review:     Recent Labs     02/17/22  0026 02/16/22  1521 02/16/22  1118 02/16/22  0015 02/16/22  0015 02/15/22  2200 02/15/22  2200   WBC 7.3  --   --   --  6.2  --  7.9   HGB 7.3* 7.4* 7.7*   < > 5.1*   < > 6.4*   HCT 23.4* 23.5* 24.9*   < > 16.9*   < > 20.8*   *  --   --   --  104*  --  122*    < > = values in this interval not displayed.      Recent Labs     02/17/22  0026 02/16/22  1118 02/16/22  0015 02/15/22  2200 02/15/22  2200     --  145  --  141   K 4.0 3.6 2.8*   < > 3.0*   *  --  117*  --  111*   CO2 19*  --  19*  --  22   *  --  148*  --  174*   BUN 16  --  14  --  15   CREA 0.93  --  0.79  --  1.10   CA 6.5*  --  5.5*  --  6.6*   MG 1.9 2.0  --   --  1.7   ALB  --   --  2.4*  --  2.8*   TBILI  --   --  1.1*  --  1.3*   ALT  --   --  22  --  27    < > = values in this interval not displayed. Lab Results   Component Value Date/Time    Glucose (POC) 161 (H) 02/17/2022 05:22 PM    Glucose (POC) 130 (H) 02/17/2022 12:02 PM    Glucose (POC) 153 (H) 02/17/2022 07:21 AM    Glucose (POC) 115 02/16/2022 08:57 PM    Glucose (POC) 126 (H) 02/16/2022 07:47 PM          Assessment / Plan:     Fevers - unclear etiology. DDx includes proctitis, metastatic prostate cancer, blood transfusion, gastroenteritis, or acute gout flare. Infectious work up including UA chest unremarkable. Follow blood and stool cx. Ceftriaxone and flagyl changed to IV Unasyn. ID consulted. VT - suspect due to anemia, IVVD, illness etc. Check echo. Replete lytes PRN. Cardiology consulted. Loose stools / Proctitis: Diarrhea possibly due to diet, history of neuroendocrine tumors but he is reportedly receiving active therapy with octreotide so oncology doubts this is etiology of diarrhea. Stools cx pending; empiric abx. Unclear if proctitis chronic or not; needs colonoscopy per GI but can be done here or back in [place]. GI following. Symptomatic Anemia / Thrombocytopenia - POA, likely due to chemotherapy and folic acid deficiency. No e/o overt GI bleed; occult stool neg. Folate level low;  oral folic acid. Hematology following.      Orthostatic dizziness / Hx HTN (hypertension) - POA due to IVVD, anemia. On no home medications at home. IVF.      Hx of gout/ Gout flare: continue oral steroids. No NSAIDs. Consult orthopedics. Hypokalemia - likely due to poor PO intake. Replete PRN.      Anorexia / Nausea / GERD (gastroesophageal reflux disease) / Hypoalbuminemia - POA due to chemo. IV Zofran PRN. Continue home megace. Prostate cancer metastatic to bone - Followed by oncology in [place]. Urology and oncology following; defer to oncology whether to get additional imaging with contrast while inpatient or to pursue OP.      Urinary incontinence - defer to urology      Diabetes - POA, hold oral meds. ISS.    Weakness - POA due to anemia, IVVD, chemo, cancer, deconditioning, etc. Fall precautions.   PT/OT eval      Total time spent with patient: 32 Minutes **I personally saw and examined the patient during this time period**                 Care Plan discussed with: Patient and Nursing Staff    Discussed:  Care Plan    Prophylaxis:  SCD's    Disposition:  Home w/Family           ___________________________________________________    Attending Physician: Alize Nunez DO

## 2022-02-17 NOTE — PROGRESS NOTES
Urology Progress Note    Patient: Ludmila Elam MRN: [id number]  SSN: [id number]    YOB: 1955  Age: 78 y.o. Sex: male        Assessment:     Ludmila Elam is a 78 y.o. male with a history of HTN, GERD, and metastatic prostate CA. He is visiting [place]- resides in [place] where he is receiving chemo (last treatment 2 weeks ago) for his prostate CA. He presented to the ED 2 days ago for c/o dizziness, SOB, generalized weakness and diarrhea. Admitted for anemia requiring blood transfusion. Urology consulted for metastatic prostate cancer. Temp overnight 100.6, VSS   No leukocytosis   Hgb 7.3 (7.4)  Cr 0.93 (0.79)  UA negative for infection   Bladder scan 18ml  BCX and stool tests pending    Plan:     1. Fever: UA negative, Cr stable, without  complaints. Consider non-urological sources of infection   2. Continue supportive treatment, with goals to stabilize and optimize- for him to return home  2. No acute Urologic intervention indicated   3. F/u with his primary Oncologist/Urologist in [place]      Provider from [place] has requested imaging - it's reasonable to fulfill that request during this admission.      Urology signing off. Thank you for this consult. Please contact [facility] with any further questions/concerns. Nanda Guthrie, FNP-C [phone]    Reason For Visit:     Follow up for prostate cancer. Interval History:     Per nursing notes overnight: \"Tele reported this pt had a 12 beat run of v-tach, nonsustained. Pt is asymptomatic and was sleeping. Pt denies feeling any symptoms. Notified on-call Provider.  AM labs to be drawn and cardiology to be consulted\"    No acute urologic events  Without urologic complaints     ROS:     Denies incomplete emptying, difficulty voiding, or frequency     Objective:     Visit Vitals  /66 (BP 1 Location: Left upper arm, BP Patient Position: At rest)   Pulse 89   Temp 98.7 °F (37.1 °C)   Resp 18   Ht 5' 11\" (1.803 m)   Wt 86 kg (189 lb 9.5 oz)   SpO2 97%   BMI 26.44 kg/m²         Intake/Output Summary (Last 24 hours) at 2/17/2022 4279  Last data filed at 2/17/2022 0103  Gross per 24 hour   Intake 1101.5 ml   Output 400 ml   Net 701.5 ml       Physical Exam  General: NAD, sitting on side of bed    Skin: Dry and intact   MSK: moves all extremities   Respiratory: no distress, room air  Abdomen: no distention  : voiding independently  Neuro: Appropriate, no focal neurological deficits    Labs reviewed, February 17, 2022  Recent Results (from the past 24 hour(s))   HGB & HCT    Collection Time: 02/16/22 11:18 AM   Result Value Ref Range    HGB 7.7 (L) 12.1 - 17.0 g/dL    HCT 24.9 (L) 36.6 - 50.3 %   POTASSIUM    Collection Time: 02/16/22 11:18 AM   Result Value Ref Range    Potassium 3.6 3.5 - 5.1 mmol/L   MAGNESIUM    Collection Time: 02/16/22 11:18 AM   Result Value Ref Range    Magnesium 2.0 1.6 - 2.4 mg/dL   OCCULT BLOOD, STOOL    Collection Time: 02/16/22  1:00 PM   Result Value Ref Range    Occult blood, stool Negative NEG     HGB & HCT    Collection Time: 02/16/22  3:21 PM   Result Value Ref Range    HGB 7.4 (L) 12.1 - 17.0 g/dL    HCT 23.5 (L) 36.6 - 50.3 %   URIC ACID    Collection Time: 02/16/22  3:21 PM   Result Value Ref Range    Uric acid 13.1 (H) 3.5 - 7.2 MG/DL   CULTURE, BLOOD, PAIRED    Collection Time: 02/16/22  3:21 PM    Specimen: Blood   Result Value Ref Range    Special Requests: NO SPECIAL REQUESTS      Culture result: NO GROWTH AFTER 12 HOURS     LACTIC ACID    Collection Time: 02/16/22  3:21 PM   Result Value Ref Range    Lactic acid 2.5 (HH) 0.4 - 2.0 MMOL/L   GLUCOSE, POC    Collection Time: 02/16/22  4:02 PM   Result Value Ref Range    Glucose (POC) 193 (H) 65 - 117 mg/dL    Performed by Danae Rangel    GLUCOSE, POC    Collection Time: 02/16/22  5:15 PM   Result Value Ref Range    Glucose (POC) 161 (H) 65 - 117 mg/dL    Performed by Erby Crigler    URINALYSIS W/ REFLEX CULTURE    Collection Time: 02/16/22  6:59 PM    Specimen: Miscellaneous sample; Urine    Urine specimen   Result Value Ref Range    Color DARK YELLOW      Appearance CLOUDY (A) CLEAR      Specific gravity 1.019 1.003 - 1.030      pH (UA) 5.5 5.0 - 8.0      Protein 100 (A) NEG mg/dL    Glucose Negative NEG mg/dL    Ketone Negative NEG mg/dL    Blood Negative NEG      Urobilinogen 2.0 (H) 0.2 - 1.0 EU/dL    Nitrites Negative NEG      Leukocyte Esterase TRACE (A) NEG      WBC 5-10 0 - 4 /hpf    RBC 0-5 0 - 5 /hpf    Epithelial cells MANY (A) FEW /lpf    Bacteria Negative NEG /hpf    UA:UC IF INDICATED CULTURE NOT INDICATED BY UA RESULT CNI      Mucus TRACE (A) NEG /lpf    Hyaline cast 10-20 0 - 5 /lpf    Granular cast 2-5 (A) NEG /lpf   BILIRUBIN, CONFIRM    Collection Time: 02/16/22  6:59 PM   Result Value Ref Range    Bilirubin UA, confirm Negative NEG     GLUCOSE, POC    Collection Time: 02/16/22  7:47 PM   Result Value Ref Range    Glucose (POC) 126 (H) 65 - 117 mg/dL    Performed by Katerina    GLUCOSE, POC    Collection Time: 02/16/22  8:57 PM   Result Value Ref Range    Glucose (POC) 115 65 - 117 mg/dL    Performed by Jake Benitez    LACTIC ACID    Collection Time: 02/16/22  9:30 PM   Result Value Ref Range    Lactic acid 1.9 0.4 - 2.0 MMOL/L   CBC WITH AUTOMATED DIFF    Collection Time: 02/17/22 12:26 AM   Result Value Ref Range    WBC 7.3 4.1 - 11.1 K/uL    RBC 2.55 (L) 4.10 - 5.70 M/uL    HGB 7.3 (L) 12.1 - 17.0 g/dL    HCT 23.4 (L) 36.6 - 50.3 %    MCV 91.8 80.0 - 99.0 FL    MCH 28.6 26.0 - 34.0 PG    MCHC 31.2 30.0 - 36.5 g/dL    RDW 21.2 (H) 11.5 - 14.5 %    PLATELET 901 (L) 630 - 400 K/uL    MPV 10.7 8.9 - 12.9 FL    NRBC 7.0 (H) 0  WBC    ABSOLUTE NRBC 0.51 (H) 0.00 - 0.01 K/uL    NEUTROPHILS 63 32 - 75 %    BAND NEUTROPHILS 5 0 - 6 %    LYMPHOCYTES 11 (L) 12 - 49 %    MONOCYTES 8 5 - 13 %    EOSINOPHILS 0 0 - 7 %    BASOPHILS 0 0 - 1 %    METAMYELOCYTES 4 (H) 0 %    MYELOCYTES 9 (H) 0 % IMMATURE GRANULOCYTES 0 %    ABS. NEUTROPHILS 5.0 1.8 - 8.0 K/UL    ABS. LYMPHOCYTES 0.8 0.8 - 3.5 K/UL    ABS. MONOCYTES 0.6 0.0 - 1.0 K/UL    ABS. EOSINOPHILS 0.0 0.0 - 0.4 K/UL    ABS. BASOPHILS 0.0 0.0 - 0.1 K/UL    ABS. IMM. GRANS. 0.0 K/UL    DF MANUAL      RBC COMMENTS ANISOCYTOSIS  2+        RBC COMMENTS OVALOCYTES  1+        RBC COMMENTS TEARDROP CELLS  1+       METABOLIC PANEL, BASIC    Collection Time: 02/17/22 12:26 AM   Result Value Ref Range    Sodium 144 136 - 145 mmol/L    Potassium 4.0 3.5 - 5.1 mmol/L    Chloride 118 (H) 97 - 108 mmol/L    CO2 19 (L) 21 - 32 mmol/L    Anion gap 7 5 - 15 mmol/L    Glucose 133 (H) 65 - 100 mg/dL    BUN 16 6 - 20 MG/DL    Creatinine 0.93 0.70 - 1.30 MG/DL    BUN/Creatinine ratio 17 12 - 20      GFR est AA >60 >60 ml/min/1.73m2    GFR est non-AA >60 >60 ml/min/1.73m2    Calcium 6.5 (L) 8.5 - 10.1 MG/DL   GLUCOSE, POC    Collection Time: 02/17/22  7:21 AM   Result Value Ref Range    Glucose (POC) 153 (H) 65 - 117 mg/dL    Performed by Sawyer Arenas        Imaging:  CT Results  (Last 48 hours)               02/16/22 1822  CT ABD PELV WO CONT Final result    Impression:      1. Thickening of the rectum with presacral edema compatible with proctitis. 2. Nonvisualization of the prostate with diffuse sclerotic metastatic disease   and enlarged retroperitoneal lymph nodes compatible with metastatic prostate   cancer. It would be helpful to obtain a CT with oral and intravenous contrast.           Narrative:  EXAM: CT ABD PELV WO CONT       INDICATION: diarrhea       COMPARISON: None       IV CONTRAST: None. ORAL CONTRAST: No       TECHNIQUE:    Thin axial images were obtained through the abdomen and pelvis. Coronal and   sagittal reformats were generated. CT dose reduction was achieved through use of   a standardized protocol tailored for this examination and automatic exposure   control for dose modulation.         The absence of intravenous contrast material reduces the sensitivity for   evaluation of the vasculature and solid organs. FINDINGS:    LOWER THORAX: Bilateral pleural effusions, right greater than left. Compressive   bibasilar atelectasis. LIVER: No mass. BILIARY TREE: Gallbladder is within normal limits. CBD is not dilated. SPLEEN: within normal limits. PANCREAS: No focal abnormality. ADRENALS: Unremarkable. KIDNEYS/URETERS: 2.4 cm right renal cyst.   STOMACH: Unremarkable. SMALL BOWEL: No dilatation or wall thickening. COLON: Rectal wall thickening. APPENDIX: Not identified. PERITONEUM: No ascites or pneumoperitoneum. RETROPERITONEUM: Prominent lymph nodes in the retroperitoneum. A 9 mm node is   seen at the level of the kidneys. At the level of the bifurcation, there is a 12   main millimeter lymph node. There is a prominent right pelvic node which   measures 7 mm. REPRODUCTIVE ORGANS: Prostatectomy. URINARY BLADDER: No mass or calculus. BONES: Diffuse sclerotic metastatic disease. ABDOMINAL WALL: No mass or hernia. ADDITIONAL COMMENTS: Presacral edema.                  Signed By: Eloy Campos NP - February 17, 2022

## 2022-02-17 NOTE — CONSULTS
Infectious Disease Consult    Impression/Plan   · Low-grade fever. Patient was febrile to 100.6 °F overnight last night but otherwise has been afebrile throughout the admission. WBC within normal limits. Not neutropenic. UA bland. Blood cultures NGSF. Stool studies including C. difficile and enteric pathogen panel pending. Possibly due to proctitis? GI following. Alternatively, fever may be due to metastatic prostate cancer, blood transfusion, or acute gout flare. Will change antibiotics to Unasyn. If he continues to improve may de-escalate to Augmentin at discharge to complete a total 7-day course of therapy  · Proctitis? 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 Chronic changes? No hematochezia, tenesmus, or pain. GI following. Empiric antibiotics as above. C. difficile and enteric pathogen panel pending  · Metastatic prostate cancer to the bone. Managed by oncologist in [place]. Patient is not neutropenic   · Diarrhea. Acute on chronic per patient. Stool studies as above  · Acute gouty flare involving the left foot. Orthopedic surgery following    Anti-infectives:   1. Cefepime  2. Metronidazole    Subjective:   Date of Consultation:  February 17, 2022  Date of Admission: 2/15/2022   Referring Physician:     Patient is a 78 y.o. male with a past medical history significant for metastatic prostate cancer with metastases to the bone on chemotherapy, diabetes mellitus, and gastroesophageal reflux disease who is being seen for fever. The patient is a [place] resident and is currently on vacation here in [place] to visit his daughter. He receives chemotherapy every 3 weeks for the metastatic prostate cancer. He is status post 2 cycles of treatment. Does not recall the name of the medication. He presents to [facility] with decreased appetite, nausea, and intermittent diarrhea. Work-up in the ER revealed the patient to be anemic with a hemoglobin of 6.4.   He was admitted for further evaluation. Work-up has revealed possible proctitis on CT scan. He spiked a low-grade fever of 100.6 °F overnight last night. The patient is currently on ceftriaxone and metronidazole. The infectious diseases service has been asked to assist with antibiotic management    Patient Active Problem List   Diagnosis Code    Anemia due to antineoplastic chemotherapy D64.81, T45.1X5A    GERD (gastroesophageal reflux disease) K21.9    HTN (hypertension) I10    Prostate cancer metastatic to bone ([facility] [address] C61, C79.51    Urinary incontinence R32    Anorexia R63.0    Chemotherapy-induced nausea R11.0, T45.1X5A    Orthostatic dizziness R42    Weakness R53.1    Hypotension I95.9    Hypokalemia E87.6    Hypoalbuminemia E88.09    DM (diabetes mellitus) ([facility]) E11.9    Folate deficiency E53.8    Loose stools R19.5    Anemia D64.9     Past Medical History:   Diagnosis Date    DM (diabetes mellitus) ([facility] [address]     GERD (gastroesophageal reflux disease)     HTN (hypertension)     Prostate cancer metastatic to bone ([facility])     Urinary incontinence       No family history on file. Social History     Tobacco Use    Smoking status: Not on file    Smokeless tobacco: Not on file   Substance Use Topics    Alcohol use: Not on file     No past surgical history on file. Prior to Admission medications    Medication Sig Start Date End Date Taking? Authorizing Provider   megestroL (MEGACE) 400 mg/10 mL (40 mg/mL) suspension Take 400 mg by mouth daily. Yes Provider, Historical   predniSONE (DELTASONE) 1 mg tablet Take 5 mg by mouth daily. Yes Provider, Historical   ondansetron hcl (ZOFRAN) 4 mg tablet Take 4 mg by mouth every eight (8) hours as needed for Nausea or Nausea or Vomiting. Yes Provider, Historical   ibuprofen (MOTRIN) 600 mg tablet Take 600 mg by mouth two (2) times a day. PRN   Yes Provider, Historical   calcium-cholecalciferol, D3, (CALTRATE 600+D) tablet Take 1 Tablet by mouth daily. Provider, Historical     No Known Allergies     Review of Systems:  A comprehensive review of systems was negative except for that written in the History of Present Illness. Objective:   Blood pressure 121/70, pulse 99, temperature 98.7 °F (37.1 °C), resp. rate 18, height 5' 10.98\" (1.803 m), weight 189 lb 9.5 oz (86 kg), SpO2 99 %. Temp (24hrs), Av.2 °F (37.3 °C), Min:98.3 °F (36.8 °C), Max:100.6 °F (38.1 °C)       Exam:    General:  Alert, cooperative, well noursished, well developed, appears stated age   Eyes:  Sclera anicteric. Mouth/Throat: Mucous membranes normal   Neck: Supple   Lungs:   Clear to auscultation bilaterally   CV:  Regular rate and rhythm   Abdomen:   Soft, non-tender.  bowel sounds normal. non-distended   Extremities:  Left great toe edema   Skin:  No rash   Lymph nodes:    Musculoskeletal:  Moves all   Lines/Devices:  Intact, no erythema, drainage or tenderness   Psych: Alert and oriented, normal mood affect given the setting       Data Review:   Recent Results (from the past 24 hour(s))   HGB & HCT    Collection Time: 22 11:18 AM   Result Value Ref Range    HGB 7.7 (L) 12.1 - 17.0 g/dL    HCT 24.9 (L) 36.6 - 50.3 %   POTASSIUM    Collection Time: 22 11:18 AM   Result Value Ref Range    Potassium 3.6 3.5 - 5.1 mmol/L   MAGNESIUM    Collection Time: 22 11:18 AM   Result Value Ref Range    Magnesium 2.0 1.6 - 2.4 mg/dL   OCCULT BLOOD, STOOL    Collection Time: 22  1:00 PM   Result Value Ref Range    Occult blood, stool Negative NEG     HGB & HCT    Collection Time: 22  3:21 PM   Result Value Ref Range    HGB 7.4 (L) 12.1 - 17.0 g/dL    HCT 23.5 (L) 36.6 - 50.3 %   URIC ACID    Collection Time: 22  3:21 PM   Result Value Ref Range    Uric acid 13.1 (H) 3.5 - 7.2 MG/DL   CULTURE, BLOOD, PAIRED    Collection Time: 22  3:21 PM    Specimen: Blood   Result Value Ref Range    Special Requests: NO SPECIAL REQUESTS      Culture result: NO GROWTH AFTER 12 HOURS     LACTIC ACID    Collection Time: 02/16/22  3:21 PM   Result Value Ref Range    Lactic acid 2.5 (HH) 0.4 - 2.0 MMOL/L   GLUCOSE, POC    Collection Time: 02/16/22  4:02 PM   Result Value Ref Range    Glucose (POC) 193 (H) 65 - 117 mg/dL    Performed by Dariana Dickerson, POC    Collection Time: 02/16/22  5:15 PM   Result Value Ref Range    Glucose (POC) 161 (H) 65 - 117 mg/dL    Performed by Iván Khalil    URINALYSIS W/ REFLEX CULTURE    Collection Time: 02/16/22  6:59 PM    Specimen: Miscellaneous sample; Urine    Urine specimen   Result Value Ref Range    Color DARK YELLOW      Appearance CLOUDY (A) CLEAR      Specific gravity 1.019 1.003 - 1.030      pH (UA) 5.5 5.0 - 8.0      Protein 100 (A) NEG mg/dL    Glucose Negative NEG mg/dL    Ketone Negative NEG mg/dL    Blood Negative NEG      Urobilinogen 2.0 (H) 0.2 - 1.0 EU/dL    Nitrites Negative NEG      Leukocyte Esterase TRACE (A) NEG      WBC 5-10 0 - 4 /hpf    RBC 0-5 0 - 5 /hpf    Epithelial cells MANY (A) FEW /lpf    Bacteria Negative NEG /hpf    UA:UC IF INDICATED CULTURE NOT INDICATED BY UA RESULT CNI      Mucus TRACE (A) NEG /lpf    Hyaline cast 10-20 0 - 5 /lpf    Granular cast 2-5 (A) NEG /lpf   BILIRUBIN, CONFIRM    Collection Time: 02/16/22  6:59 PM   Result Value Ref Range    Bilirubin UA, confirm Negative NEG     GLUCOSE, POC    Collection Time: 02/16/22  7:47 PM   Result Value Ref Range    Glucose (POC) 126 (H) 65 - 117 mg/dL    Performed by Katerina    GLUCOSE, POC    Collection Time: 02/16/22  8:57 PM   Result Value Ref Range    Glucose (POC) 115 65 - 117 mg/dL    Performed by Milton Reddy    LACTIC ACID    Collection Time: 02/16/22  9:30 PM   Result Value Ref Range    Lactic acid 1.9 0.4 - 2.0 MMOL/L   CBC WITH AUTOMATED DIFF    Collection Time: 02/17/22 12:26 AM   Result Value Ref Range    WBC 7.3 4.1 - 11.1 K/uL    RBC 2.55 (L) 4.10 - 5.70 M/uL    HGB 7.3 (L) 12.1 - 17.0 g/dL    HCT 23.4 (L) 36.6 - 50.3 %    MCV 91.8 80.0 - 99.0 FL    MCH 28.6 26.0 - 34.0 PG    MCHC 31.2 30.0 - 36.5 g/dL    RDW 21.2 (H) 11.5 - 14.5 %    PLATELET 838 (L) 385 - 400 K/uL    MPV 10.7 8.9 - 12.9 FL    NRBC 7.0 (H) 0  WBC    ABSOLUTE NRBC 0.51 (H) 0.00 - 0.01 K/uL    NEUTROPHILS 63 32 - 75 %    BAND NEUTROPHILS 5 0 - 6 %    LYMPHOCYTES 11 (L) 12 - 49 %    MONOCYTES 8 5 - 13 %    EOSINOPHILS 0 0 - 7 %    BASOPHILS 0 0 - 1 %    METAMYELOCYTES 4 (H) 0 %    MYELOCYTES 9 (H) 0 %    IMMATURE GRANULOCYTES 0 %    ABS. NEUTROPHILS 5.0 1.8 - 8.0 K/UL    ABS. LYMPHOCYTES 0.8 0.8 - 3.5 K/UL    ABS. MONOCYTES 0.6 0.0 - 1.0 K/UL    ABS. EOSINOPHILS 0.0 0.0 - 0.4 K/UL    ABS. BASOPHILS 0.0 0.0 - 0.1 K/UL    ABS. IMM.  GRANS. 0.0 K/UL    DF MANUAL      RBC COMMENTS ANISOCYTOSIS  2+        RBC COMMENTS OVALOCYTES  1+        RBC COMMENTS TEARDROP CELLS  1+       METABOLIC PANEL, BASIC    Collection Time: 02/17/22 12:26 AM   Result Value Ref Range    Sodium 144 136 - 145 mmol/L    Potassium 4.0 3.5 - 5.1 mmol/L    Chloride 118 (H) 97 - 108 mmol/L    CO2 19 (L) 21 - 32 mmol/L    Anion gap 7 5 - 15 mmol/L    Glucose 133 (H) 65 - 100 mg/dL    BUN 16 6 - 20 MG/DL    Creatinine 0.93 0.70 - 1.30 MG/DL    BUN/Creatinine ratio 17 12 - 20      GFR est AA >60 >60 ml/min/1.73m2    GFR est non-AA >60 >60 ml/min/1.73m2    Calcium 6.5 (L) 8.5 - 10.1 MG/DL   GLUCOSE, POC    Collection Time: 02/17/22  7:21 AM   Result Value Ref Range    Glucose (POC) 153 (H) 65 - 117 mg/dL    Performed by Usama Amaya         Microbiology:      Studies:      Signed By: Sobia Gu DO     February 17, 2022

## 2022-02-17 NOTE — PROGRESS NOTES
Tele reported this pt had a 12 beat run of v-tach, nonsustained. Pt is asymptomatic and was sleeping. Pt denies feeling any symptoms. Notified on-call Provider. AM labs to be drawn and cardiology to be consulted.

## 2022-02-17 NOTE — PROGRESS NOTES
[address] NP  [phone]           GI PROGRESS NOTE        NAME: Michiel Rubinstein   :  1955   MRN:  [id number]       Subjective: Thinks the diarrhea is a bit better. No abdominal pain. Had diarrhea after an ensure. Complains of gout pain in his right foot. Objective:   NAD      VITALS:   Last 24hrs VS reviewed since prior progress note. Most recent are:  Visit Vitals  BP (!) 98/54   Pulse 93   Temp 98.1 °F (36.7 °C)   Resp 18   Ht 5' 11\" (1.803 m)   Wt 85.7 kg (189 lb)   SpO2 100%   BMI 26.36 kg/m²       Intake/Output Summary (Last 24 hours) at 2022 1559  Last data filed at 2022 1543  Gross per 24 hour   Intake 2683.33 ml   Output 530 ml   Net 2153.33 ml       PHYSICAL EXAM:  General: Alert, in no acute distress    HEENT: Anicteric sclerae. Lungs:            CTA Bilaterally. Heart:  Regular  rhythm,    Abdomen: Soft, Non distended, Non tender.  (+)Bowel sounds, no HSM  Extremities: No c/c/e  Neurologic:  CN 2-12 gi, Alert and oriented X 3. No acute neurological distress   Psych:   Good insight. Not anxious nor agitated. Lab Data Reviewed:   Recent Labs     22  0026 22  1521 22  1118 22  0015   WBC 7.3  --   --  6.2   HGB 7.3* 7.4*   < > 5.1*   HCT 23.4* 23.5*   < > 16.9*   *  --   --  104*    < > = values in this interval not displayed. Recent Labs     22  0026 22  1118 22  0015 22  0015     --   --  145   K 4.0 3.6   < > 2.8*   *  --   --  117*   CO2 19*  --   --  19*   BUN 16  --   --  14   CREA 0.93  --   --  0.79   *  --   --  148*   CA 6.5*  --   --  5.5*    < > = values in this interval not displayed.      Recent Labs     22  0015 02/15/22  2200   * 149*   TP 5.1* 6.7   ALB 2.4* 2.8*   GLOB 2.7 3.9       ________________________________________________________________________  Patient Active Problem List   Diagnosis Code    Anemia due to antineoplastic chemotherapy D64.81, T45.1X5A    GERD (gastroesophageal reflux disease) K21.9    HTN (hypertension) I10    Prostate cancer metastatic to bone ([facility]) C61, C79.51    Urinary incontinence R32    Anorexia R63.0    Chemotherapy-induced nausea R11.0, T45.1X5A    Orthostatic dizziness R42    Weakness R53.1    Hypotension I95.9    Hypokalemia E87.6    Hypoalbuminemia E88.09    DM (diabetes mellitus) ([facility]) E11.9    Folate deficiency E53.8    Loose stools R19.5    Anemia D64.9         Assessment and Plan:  Diarrhea: This is maybe a little better per his report. No abdominal pain.  Enteric panel and C Diff is negative. CT scan is showing proctitis. - Supportive care with imodium  - Diet as tolerated  - Continue monitoring electrolytes. - Continue supportive care. - Continue antibiotics per ID   - Nutrition is following  - Needs close follow up for outpatient colonoscopy with his provider in [place] for findings of proctitis. ? If this is related to prostate biopsy.      Following.           Signed By: Johnny Felipe NP     2/17/2022  3:59 PM

## 2022-02-18 ENCOUNTER — APPOINTMENT (OUTPATIENT)
Dept: GENERAL RADIOLOGY | Age: 67
DRG: 812 | End: 2022-02-18
Attending: NURSE PRACTITIONER
Payer: MEDICARE

## 2022-02-18 LAB
ANION GAP SERPL CALC-SCNC: 6 MMOL/L (ref 5–15)
BASOPHILS # BLD: 0.1 K/UL (ref 0–0.1)
BASOPHILS NFR BLD: 1 % (ref 0–1)
BUN SERPL-MCNC: 15 MG/DL (ref 6–20)
BUN/CREAT SERPL: 15 (ref 12–20)
CALCIUM SERPL-MCNC: 6.5 MG/DL (ref 8.5–10.1)
CHLORIDE SERPL-SCNC: 116 MMOL/L (ref 97–108)
CO2 SERPL-SCNC: 20 MMOL/L (ref 21–32)
CREAT SERPL-MCNC: 0.97 MG/DL (ref 0.7–1.3)
DIFFERENTIAL METHOD BLD: ABNORMAL
EOSINOPHIL # BLD: 0 K/UL (ref 0–0.4)
EOSINOPHIL NFR BLD: 0 % (ref 0–7)
ERYTHROCYTE [DISTWIDTH] IN BLOOD BY AUTOMATED COUNT: 21.2 % (ref 11.5–14.5)
GLUCOSE BLD STRIP.AUTO-MCNC: 155 MG/DL (ref 65–117)
GLUCOSE BLD STRIP.AUTO-MCNC: 165 MG/DL (ref 65–117)
GLUCOSE SERPL-MCNC: 128 MG/DL (ref 65–100)
HCT VFR BLD AUTO: 22.8 % (ref 36.6–50.3)
HEMOCCULT STL QL: NEGATIVE
HGB BLD-MCNC: 6.9 G/DL (ref 12.1–17)
HISTORY CHECKED?,CKHIST: NORMAL
IMM GRANULOCYTES # BLD AUTO: 0 K/UL
IMM GRANULOCYTES NFR BLD AUTO: 0 %
LYMPHOCYTES # BLD: 0.9 K/UL (ref 0.8–3.5)
LYMPHOCYTES NFR BLD: 11 % (ref 12–49)
MCH RBC QN AUTO: 27.6 PG (ref 26–34)
MCHC RBC AUTO-ENTMCNC: 30.3 G/DL (ref 30–36.5)
MCV RBC AUTO: 91.2 FL (ref 80–99)
METAMYELOCYTES NFR BLD MANUAL: 5 %
MONOCYTES # BLD: 0.5 K/UL (ref 0–1)
MONOCYTES NFR BLD: 6 % (ref 5–13)
MYELOCYTES NFR BLD MANUAL: 10 %
NEUTS SEG # BLD: 5.4 K/UL (ref 1.8–8)
NEUTS SEG NFR BLD: 67 % (ref 32–75)
NRBC # BLD: 0.64 K/UL (ref 0–0.01)
NRBC BLD-RTO: 8.1 PER 100 WBC
O+P SPEC MICRO: NORMAL
O+P STL CONC: NORMAL
PLATELET # BLD AUTO: 111 K/UL (ref 150–400)
PMV BLD AUTO: 10.3 FL (ref 8.9–12.9)
POTASSIUM SERPL-SCNC: 4.2 MMOL/L (ref 3.5–5.1)
RBC # BLD AUTO: 2.5 M/UL (ref 4.1–5.7)
RBC MORPH BLD: ABNORMAL
SERVICE CMNT-IMP: ABNORMAL
SERVICE CMNT-IMP: ABNORMAL
SODIUM SERPL-SCNC: 142 MMOL/L (ref 136–145)
SPECIMEN SOURCE: NORMAL
WBC # BLD AUTO: 8 K/UL (ref 4.1–11.1)

## 2022-02-18 PROCEDURE — 82272 OCCULT BLD FECES 1-3 TESTS: CPT

## 2022-02-18 PROCEDURE — P9016 RBC LEUKOCYTES REDUCED: HCPCS

## 2022-02-18 PROCEDURE — APPSS30 APP SPLIT SHARED TIME 16-30 MINUTES: Performed by: NURSE PRACTITIONER

## 2022-02-18 PROCEDURE — 74011250636 HC RX REV CODE- 250/636: Performed by: HOSPITALIST

## 2022-02-18 PROCEDURE — 71045 X-RAY EXAM CHEST 1 VIEW: CPT

## 2022-02-18 PROCEDURE — 65660000000 HC RM CCU STEPDOWN

## 2022-02-18 PROCEDURE — 74011636637 HC RX REV CODE- 636/637: Performed by: HOSPITALIST

## 2022-02-18 PROCEDURE — 99231 SBSQ HOSP IP/OBS SF/LOW 25: CPT | Performed by: INTERNAL MEDICINE

## 2022-02-18 PROCEDURE — 74011250636 HC RX REV CODE- 250/636: Performed by: INTERNAL MEDICINE

## 2022-02-18 PROCEDURE — 82962 GLUCOSE BLOOD TEST: CPT

## 2022-02-18 PROCEDURE — 36415 COLL VENOUS BLD VENIPUNCTURE: CPT

## 2022-02-18 PROCEDURE — 74011000258 HC RX REV CODE- 258: Performed by: INTERNAL MEDICINE

## 2022-02-18 PROCEDURE — 74011250637 HC RX REV CODE- 250/637: Performed by: INTERNAL MEDICINE

## 2022-02-18 PROCEDURE — 36430 TRANSFUSION BLD/BLD COMPNT: CPT

## 2022-02-18 PROCEDURE — 80048 BASIC METABOLIC PNL TOTAL CA: CPT

## 2022-02-18 PROCEDURE — 85025 COMPLETE CBC W/AUTO DIFF WBC: CPT

## 2022-02-18 RX ORDER — SODIUM CHLORIDE 9 MG/ML
250 INJECTION, SOLUTION INTRAVENOUS AS NEEDED
Status: DISCONTINUED | OUTPATIENT
Start: 2022-02-18 | End: 2022-02-20 | Stop reason: HOSPADM

## 2022-02-18 RX ORDER — PREDNISONE 20 MG/1
20 TABLET ORAL DAILY
Status: DISCONTINUED | OUTPATIENT
Start: 2022-02-18 | End: 2022-02-20 | Stop reason: HOSPADM

## 2022-02-18 RX ORDER — KETOROLAC TROMETHAMINE 30 MG/ML
15 INJECTION, SOLUTION INTRAMUSCULAR; INTRAVENOUS
Status: COMPLETED | OUTPATIENT
Start: 2022-02-18 | End: 2022-02-18

## 2022-02-18 RX ORDER — KETOROLAC TROMETHAMINE 30 MG/ML
15 INJECTION, SOLUTION INTRAMUSCULAR; INTRAVENOUS
Status: DISCONTINUED | OUTPATIENT
Start: 2022-02-18 | End: 2022-02-20 | Stop reason: HOSPADM

## 2022-02-18 RX ORDER — SODIUM CHLORIDE 9 MG/ML
100 INJECTION, SOLUTION INTRAVENOUS CONTINUOUS
Status: DISCONTINUED | OUTPATIENT
Start: 2022-02-18 | End: 2022-02-20 | Stop reason: HOSPADM

## 2022-02-18 RX ADMIN — PREDNISONE 20 MG: 20 TABLET ORAL at 11:04

## 2022-02-18 RX ADMIN — SODIUM CHLORIDE 100 ML/HR: 9 INJECTION, SOLUTION INTRAVENOUS at 17:45

## 2022-02-18 RX ADMIN — FOLIC ACID 1 MG: 1 TABLET ORAL at 11:04

## 2022-02-18 RX ADMIN — KETOROLAC TROMETHAMINE 15 MG: 30 INJECTION, SOLUTION INTRAMUSCULAR; INTRAVENOUS at 21:13

## 2022-02-18 RX ADMIN — AMPICILLIN SODIUM AND SULBACTAM SODIUM 3 G: 2; 1 INJECTION, POWDER, FOR SOLUTION INTRAMUSCULAR; INTRAVENOUS at 17:40

## 2022-02-18 RX ADMIN — AMPICILLIN SODIUM AND SULBACTAM SODIUM 3 G: 2; 1 INJECTION, POWDER, FOR SOLUTION INTRAMUSCULAR; INTRAVENOUS at 01:23

## 2022-02-18 RX ADMIN — AMPICILLIN SODIUM AND SULBACTAM SODIUM 3 G: 2; 1 INJECTION, POWDER, FOR SOLUTION INTRAMUSCULAR; INTRAVENOUS at 12:01

## 2022-02-18 RX ADMIN — POTASSIUM CHLORIDE AND SODIUM CHLORIDE: 900; 300 INJECTION, SOLUTION INTRAVENOUS at 01:23

## 2022-02-18 RX ADMIN — KETOROLAC TROMETHAMINE 15 MG: 30 INJECTION, SOLUTION INTRAMUSCULAR at 01:23

## 2022-02-18 RX ADMIN — MEGESTROL ACETATE 400 MG: 40 SUSPENSION ORAL at 12:03

## 2022-02-18 RX ADMIN — AMPICILLIN SODIUM AND SULBACTAM SODIUM 3 G: 2; 1 INJECTION, POWDER, FOR SOLUTION INTRAMUSCULAR; INTRAVENOUS at 06:23

## 2022-02-18 RX ADMIN — KETOROLAC TROMETHAMINE 15 MG: 30 INJECTION, SOLUTION INTRAMUSCULAR; INTRAVENOUS at 12:00

## 2022-02-18 NOTE — PROGRESS NOTES
Varun Cisneros [facility] [address]  [address] [place] [address]  [phone]      Medical Progress Note      NAME: Sara Godoy   :  1955  MRM:  [id number]    Date/Time of service: 2022         Subjective:     Chief Complaint:  Feeling better . No blood in stool . Ankle hurting. He denies diarrhea but RN says having Diarrhea. Objective:       Vitals:       Last 24hrs VS reviewed since prior progress note. Most recent are:    Visit Vitals  BP (!) 100/56 (BP 1 Location: Right upper arm, BP Patient Position: At rest)   Pulse 87   Temp 98.6 °F (37 °C)   Resp 18   Ht 5' 11\" (1.803 m)   Wt 85.7 kg (189 lb)   SpO2 98%   BMI 26.36 kg/m²     SpO2 Readings from Last 6 Encounters:   22 98%            Intake/Output Summary (Last 24 hours) at 2022 0758  Last data filed at 2022 1559  Gross per 24 hour   Intake 2913.33 ml   Output 130 ml   Net 2783.33 ml        Exam:     Physical Exam:    Gen:   in no acute distress  HEENT:  Pink conjunctivae, PERRL, hearing intact to voice  Resp:  No accessory muscle use, clear breath sounds without wheezes rales or rhonchi  Card:  RRR, No murmurs, normal S1, S2, no peripheral edema  Abd:  Soft, non-tender, non-distended, normoactive bowel sounds are present  Musc:  No cyanosis or clubbing.  Ankle swollen and painful  Skin:  No rashes or ulcers, skin turgor is good  Neuro:  Cranial nerves 3-12 are grossly intact, follows commands appropriately  Psych:  Oriented to person, place, and time, Alert with good insight      Medications Reviewed: (see below)    Lab Data Reviewed: (see below)    ______________________________________________________________________    Medications:     Current Facility-Administered Medications   Medication Dose Route Frequency    0.9% sodium chloride infusion 250 mL  250 mL IntraVENous PRN    predniSONE (DELTASONE) tablet 20 mg  20 mg Oral DAILY    calcium carbonate (TUMS) chewable tablet 200 mg [elemental]  200 mg Oral PRN    ampicillin-sulbactam (UNASYN) 3 g in 0.9% sodium chloride (MBP/ADV) 100 mL MBP  3 g IntraVENous Q6H    0.9% sodium chloride infusion 250 mL  250 mL IntraVENous PRN    megestroL (MEGACE) 400 mg/10 mL (40 mg/mL) oral suspension 400 mg  400 mg Oral DAILY    folic acid (FOLVITE) tablet 1 mg  1 mg Oral DAILY    insulin lispro (HUMALOG) injection   SubCUTAneous AC&HS    glucose chewable tablet 16 g  4 Tablet Oral PRN    dextrose (D50W) injection syrg 12.5-25 g  12.5-25 g IntraVENous PRN    glucagon (GLUCAGEN) injection 1 mg  1 mg IntraMUSCular PRN    loperamide (IMODIUM) capsule 2 mg  2 mg Oral Q4H PRN    0.9% sodium chloride infusion 250 mL  250 mL IntraVENous PRN    0.9% sodium chloride with KCl 40 mEq/L infusion   IntraVENous CONTINUOUS    acetaminophen (TYLENOL) tablet 650 mg  650 mg Oral Q6H PRN    Or    acetaminophen (TYLENOL) suppository 650 mg  650 mg Rectal Q6H PRN    polyethylene glycol (MIRALAX) packet 17 g  17 g Oral DAILY PRN    ondansetron (ZOFRAN ODT) tablet 4 mg  4 mg Oral Q8H PRN    Or    ondansetron (ZOFRAN) injection 4 mg  4 mg IntraVENous Q6H PRN          Lab Review:     Recent Labs     02/18/22  0049 02/17/22  0026 02/16/22  1521 02/16/22  1118 02/16/22  0015   WBC 8.0 7.3  --   --  6.2   HGB 6.9* 7.3* 7.4*   < > 5.1*   HCT 22.8* 23.4* 23.5*   < > 16.9*   * 110*  --   --  104*    < > = values in this interval not displayed.      Recent Labs     02/18/22  0049 02/17/22  0026 02/16/22  1118 02/16/22  0015 02/16/22  0015 02/15/22  2200 02/15/22  2200    144  --   --  145   < > 141   K 4.2 4.0 3.6   < > 2.8*   < > 3.0*   * 118*  --   --  117*   < > 111*   CO2 20* 19*  --   --  19*   < > 22   * 133*  --   --  148*   < > 174*   BUN 15 16  --   --  14   < > 15   CREA 0.97 0.93  --   --  0.79   < > 1.10   CA 6.5* 6.5*  --   --  5.5*   < > 6.6*   MG  --  1.9 2.0  --   --   --  1.7   ALB  --   --   --   --  2.4*  --  2.8*   TBILI  --   --   --   --  1.1* --  1.3*   ALT  --   --   --   --  22  --  27    < > = values in this interval not displayed. Lab Results   Component Value Date/Time    Glucose (POC) 181 (H) 02/17/2022 09:20 PM    Glucose (POC) 161 (H) 02/17/2022 05:22 PM    Glucose (POC) 130 (H) 02/17/2022 12:02 PM    Glucose (POC) 153 (H) 02/17/2022 07:21 AM    Glucose (POC) 115 02/16/2022 08:57 PM          Assessment / Plan:     Fevers - unclear etiology. DDx includes proctitis, metastatic prostate cancer, blood transfusion, gastroenteritis, or acute gout flare. Infectious work up including UA chest unremarkable. Follow blood and stool cx. Ceftriaxone and flagyl changed to IV Unasyn. ID consulted. May be due to Gouty flare. VT - suspect due to anemia, IVVD, illness etc. Check echo. Replete lytes PRN. Cardiology consulted. No more VT    Loose stools / Proctitis: t, history of neuroendocrine tumors but he is reportedly receiving active therapy with octreotide so oncology doubts this is etiology of diarrhea. Diarrhea is chronic not acute. . Unclear if proctitis chronic or not; needs colonoscopy per GI but can be done here or back in [place]. C diff negative     Symptomatic Anemia / Thrombocytopenia - POA, likely due to chemotherapy and folic acid deficiency. No e/o overt GI bleed; occult stool neg. Folate level low;  oral folic acid. Hematology following. Transfuse 1 unit today  I have discussed with the patient the rationale for blood component transfusion; its benefits in treating or preventing fatigue, organ damage, or death; and its risk which includes mild transfusion reactions, rare risk of blood borne infection, or more serious but rare reactions. I have discussed the alternatives to transfusion, including the risk and consequences of not receiving transfusion. The patient had an opportunity to ask questions and had agreed to proceed with transfusion of blood components.     Orthostatic dizziness / Hx HTN (hypertension) - POA due to IVVD, anemia.   On no home medications at home. IVF.      Hx of gout/ Gout flare: Increase  oral steroids. No NSAIDs. Consult orthopedics. Hypokalemia - likely due to poor PO intake. Replete PRN.      Anorexia / Nausea / GERD (gastroesophageal reflux disease) / Hypoalbuminemia - POA due to chemo. IV Zofran PRN. Continue home megace. Prostate cancer metastatic to bone - Followed by oncology in [place]. Urology and oncology following; defer to oncology whether to get additional imaging with contrast while inpatient or to pursue OP.      Urinary incontinence - defer to urology      Diabetes - POA, hold oral meds. ISS.      Weakness - POA due to anemia, IVVD, chemo, cancer, deconditioning, etc. Fall precautions.   PT/OT eval      Total time spent with patient: 32 Minutes **I personally saw and examined the patient during this time period**                 Care Plan discussed with: Patient and Nursing Staff    Discussed:  Care Plan    Prophylaxis:  SCD's    Disposition:  Home w/Family           ___________________________________________________    Attending Physician: Nia Andino MD

## 2022-02-18 NOTE — PROGRESS NOTES
Physical Therapy Note:  RN stating pt receiving PRBC's. Hgb 6.9 this morining. Will defer and continue to follow.   Wolf Lopez, PT

## 2022-02-18 NOTE — PROGRESS NOTES
[address] NP  [phone]           GI PROGRESS NOTE        NAME: Isidoro Jo   :  1955   MRN:  [id number]       Subjective:   No complaints this afternoon. Eating well. Stools are lose but not watery. No abdominal pain. Objective:   NAD      VITALS:   Last 24hrs VS reviewed since prior progress note. Most recent are:  Visit Vitals  /80 (BP 1 Location: Right lower arm, BP Patient Position: At rest)   Pulse 94   Temp 98.4 °F (36.9 °C)   Resp 18   Ht 5' 11\" (1.803 m)   Wt 85.7 kg (189 lb)   SpO2 99%   BMI 26.36 kg/m²       Intake/Output Summary (Last 24 hours) at 2022 1554  Last data filed at 2022 1248  Gross per 24 hour   Intake 777.5 ml   Output 400 ml   Net 377.5 ml       PHYSICAL EXAM:  General: Alert, in no acute distress    HEENT: Anicteric sclerae. Lungs:            CTA Bilaterally. Heart:  Regular  rhythm,    Abdomen: Soft, Non distended, Non tender.  (+)Bowel sounds, no HSM  Extremities: No c/c/e  Neurologic:  CN 2-12 gi, Alert and oriented X 3. No acute neurological distress   Psych:   Good insight. Not anxious nor agitated.     Lab Data Reviewed:   Recent Labs     22  0049 22  0026   WBC 8.0 7.3   HGB 6.9* 7.3*   HCT 22.8* 23.4*   * 110*     Recent Labs     22  0049 22  0026    144   K 4.2 4.0   * 118*   CO2 20* 19*   BUN 15 16   CREA 0.97 0.93   * 133*   CA 6.5* 6.5*     Recent Labs     22  0015 02/15/22  2200   * 149*   TP 5.1* 6.7   ALB 2.4* 2.8*   GLOB 2.7 3.9       ________________________________________________________________________  Patient Active Problem List   Diagnosis Code    Anemia due to antineoplastic chemotherapy D64.81, T45.1X5A    GERD (gastroesophageal reflux disease) K21.9    HTN (hypertension) I10    Prostate cancer metastatic to bone (HCC) C61, C79.51    Urinary incontinence R32    Anorexia R63.0    Chemotherapy-induced nausea R11.0, T45.1X5A  Orthostatic dizziness R42    Weakness R53.1    Hypotension I95.9    Hypokalemia E87.6    Hypoalbuminemia E88.09    DM (diabetes mellitus) ([facility]) E11.9    Folate deficiency E53.8    Loose stools R19.5    Anemia D64.9         Assessment and Plan:  Diarrhea: This is maybe a little better per his report. No abdominal pain.  Enteric panel and C Diff is negative. CT scan is showing proctitis.      - Supportive care with imodium  - Diet as tolerated  - Continue monitoring electrolytes.    - Continue supportive care.   - Continue antibiotics per ID   - Nutrition is following  - Needs close follow up for outpatient colonoscopy with his provider in [place] for findings of proctitis. ? If this is related to prostate biopsy. Discussed with him again today and he voiced understanding. Ok to discharge from GI perspective. Please contact the on call GI provider with questions over the weekend.         Signed By: Yaakov Vaughn NP     2/18/2022  3:54 PM

## 2022-02-18 NOTE — PROGRESS NOTES
Patient is negative for c. Diff per lab and enteric testing. Patient aware. Removing isolation precautions at this time.     Jelani Raymundo [facility]

## 2022-02-18 NOTE — PROGRESS NOTES
CARDIOLOGY PROGRESS NOTE        [address], [place], [address] [facility]  Phone [phone]; Fax [phone]          2022 10:19 AM       Admit Date:           2/15/2022  Admit Diagnosis:  Anemia [D64.9]  :          1955   MRN:          [id number]        Assessment/Plan  1. VT: 12-beat run, HR also elevated at 1 time w/ activity to 150's - appeared ST. Replete lytes/hydrate d/t diarrhea. TTE w/ nl EF, pBNP elevated to 3805. Pt is from [place] and will return on d/c so will hold on 30 day event monitor     2. Fever unknown origin: having episodes of diarrhea, lactate elevated . On abx - ID following      3. HTN: not on meds since he lost sig amount of weight, BP low      4. Hx of metastatic prostate CA: on chemo, heme/onc following     5. Anemia/thrombocytopenia: likely d/t chemo. Occult stool neg prev, per primary team    6. Dyspnea: could be anemia related. TTE w/ normal EF, check xray to eval for pleural effusions    Pt to f/u with his PCP and cardiology referral at home in [place] - stressed importance of this with pt. Will sign off and see prn       Chart reviewed. Agree with advanced NP's history, exam and  A/P with changes/additons. Blood pressure 115/80, pulse 90, temperature 98.4 °F (36.9 °C), resp. rate 18, height 5' 11\" (1.803 m), weight 189 lb (85.7 kg), SpO2 99 %. A/P:  NSVT 22 - asymptomatic - no further episodes check and replete lytes as needed. Avoid QT prolonging meds  02/15/22    ECHO ADULT COMPLETE 2022    Interpretation Summary    Left Ventricle: Left ventricle size is normal. Mildly increased wall thickness. Normal wall motion. Normal left ventricular systolic function. Normal diastolic function.   Left Atrium: Left atrium is moderately dilated. Signed by: Per Michelle MD on 2022 10:31 PM         Metastatic Prostate Ca      Will see prn. Plz call if needed    [employer].  MD, [facility]          We discussed the expected course, resolution and complications of the diagnosis(es) in detail. Medication risks, benefits, costs, interactions, and alternatives were discussed as indicated. No intake/output data recorded. Last 3 Recorded Weights in this Encounter    02/15/22 2126 02/16/22 2152 02/17/22 1500   Weight: 86.2 kg (190 lb) 86 kg (189 lb 9.5 oz) 85.7 kg (189 lb)         02/16 1901 - 02/18 0700  In: 3163.3 [P.O.:970; I.V.:2193.3]  Out: 930 [Urine:930]    SUBJECTIVE      78 y.o.  male  with PMH significant for prostate CA, HTN, anemia and GERD. Episode of VT while inpatient. [facility] reports none.       Current Facility-Administered Medications   Medication Dose Route Frequency    0.9% sodium chloride infusion 250 mL  250 mL IntraVENous PRN    predniSONE (DELTASONE) tablet 20 mg  20 mg Oral DAILY    calcium carbonate (TUMS) chewable tablet 200 mg [elemental]  200 mg Oral PRN    ampicillin-sulbactam (UNASYN) 3 g in 0.9% sodium chloride (MBP/ADV) 100 mL MBP  3 g IntraVENous Q6H    0.9% sodium chloride infusion 250 mL  250 mL IntraVENous PRN    megestroL (MEGACE) 400 mg/10 mL (40 mg/mL) oral suspension 400 mg  400 mg Oral DAILY    folic acid (FOLVITE) tablet 1 mg  1 mg Oral DAILY    insulin lispro (HUMALOG) injection   SubCUTAneous AC&HS    glucose chewable tablet 16 g  4 Tablet Oral PRN    dextrose (D50W) injection syrg 12.5-25 g  12.5-25 g IntraVENous PRN    glucagon (GLUCAGEN) injection 1 mg  1 mg IntraMUSCular PRN    loperamide (IMODIUM) capsule 2 mg  2 mg Oral Q4H PRN    0.9% sodium chloride infusion 250 mL  250 mL IntraVENous PRN    0.9% sodium chloride with KCl 40 mEq/L infusion   IntraVENous CONTINUOUS    acetaminophen (TYLENOL) tablet 650 mg  650 mg Oral Q6H PRN    Or    acetaminophen (TYLENOL) suppository 650 mg  650 mg Rectal Q6H PRN    polyethylene glycol (MIRALAX) packet 17 g  17 g Oral DAILY PRN    ondansetron (ZOFRAN ODT) tablet 4 mg  4 mg Oral Q8H PRN    Or    ondansetron (ZOFRAN) injection 4 mg  4 mg IntraVENous Q6H PRN      OBJECTIVE               Intake/Output Summary (Last 24 hours) at 2/18/2022 1019  Last data filed at 2/18/2022 0622  Gross per 24 hour   Intake 1714.58 ml   Output 430 ml   Net 1284.58 ml       Review of Systems - History obtained from the patient AS PER  HPI        PHYSICAL EXAM        Visit Vitals  BP 97/65 (BP 1 Location: Right upper arm, BP Patient Position: At rest)   Pulse 87 Comment: 86   Temp 98.2 °F (36.8 °C)   Resp 18   Ht 5' 11\" (1.803 m)   Wt 85.7 kg (189 lb)   SpO2 97%   BMI 26.36 kg/m²       Gen: Well-developed, well-nourished, in no acute distress  alert and oriented x 3  HEENT:  Pink conjunctivae, Hearing grossly normal.No scleral icterus or conjunctival, moist mucous membranes  Neck: Supple,No JVD, No Carotid Bruit, Thyroid- non tender No cervical lymphadenopathy  Resp: No accessory muscle use, Clear breath sounds, No rales or rhonchi  Card: Regular Rate,Rythm,Normal S1, S2, No murmurs, rubs or gallop. No thrills. MSK: No cyanosis or clubbing, good capillary refill  Skin: No rashes or ulcers, no bruising  Neuro:  Moving all four extremities, no focal deficit, follows commands appropriately  Psych:  Good insight, oriented to person, place and time, alert, Nml Affect  LE: Trace edema       DATA REVIEW      No specialty comments available. Cardiac monitor: SR w/ few PVC's        Laboratory and Imaging have been reviewed by me and are notable for  No results for input(s): CPK, CKMB, TROIQ in the last 72 hours.   Recent Labs     02/18/22  0049 02/17/22  0026 02/16/22  1521 02/16/22  1118 02/16/22  1118 02/16/22  0015 02/16/22  0015 02/15/22  2200 02/15/22  2200    144  --   --   --   --  145   < > 141   K 4.2 4.0  --   --  3.6   < > 2.8*   < > 3.0*   CO2 20* 19*  --   --   --   --  19*   < > 22   BUN 15 16  --   --   --   --  14   < > 15   CREA 0.97 0.93  --   --   --   --  0.79   < > 1.10   * 133*  -- --   --   --  148*   < > 174*   MG  --  1.9  --   --  2.0  --   --   --  1.7   WBC 8.0 7.3  --   --   --   --  6.2   < > 7.9   HGB 6.9* 7.3* 7.4*   < > 7.7*   < > 5.1*   < > 6.4*   HCT 22.8* 23.4* 23.5*   < > 24.9*   < > 16.9*   < > 20.8*   * 110*  --   --   --   --  104*   < > 122*    < > = values in this interval not displayed.

## 2022-02-18 NOTE — PROGRESS NOTES
2/18/2022  CARE MANAGEMENT NOTE:  CM reviewed EMR for clinical updates. Pt was admitted with anemia; also with metastatic prostate cancer to the bone. Reportedly, pt lives with his wife and two sons in [place]. They are visiting family in [place]     RUR 15%     Transition Plan of Care:  1. Urology, ID, GI, Hem/onc following for medical management  2. Pt will return home to [place]  3. PT/OT evals complete; pt ambulated 20 feet on 2/17 and home health was recommended (pt will have to obtain order from PCP in [place] where he resides). 4.  Rolling walker provided to pt from [facility] closet maintained by [employer]  5. Pt will arrange his own transportation home to [address] [place] [address] Se will continue to follow pt until discharged.   Tona

## 2022-02-18 NOTE — PROGRESS NOTES
Occupational Therapy:  02/18/22    Chart reviewed in prep for OT tx. Noted hgb of 6.9 this AM and pt currently receiving transfusion and unavailable to participate at this time. Will continue to follow.      Thank you,  Magda Green OTR/MICHA

## 2022-02-19 LAB
ABO + RH BLD: NORMAL
ANION GAP SERPL CALC-SCNC: 7 MMOL/L (ref 5–15)
BASOPHILS # BLD: 0 K/UL (ref 0–0.1)
BASOPHILS NFR BLD: 0 % (ref 0–1)
BLD PROD TYP BPU: NORMAL
BLOOD GROUP ANTIBODIES SERPL: NORMAL
BPU ID: NORMAL
BUN SERPL-MCNC: 16 MG/DL (ref 6–20)
BUN/CREAT SERPL: 20 (ref 12–20)
CA-I BLD-SCNC: 0.83 MMOL/L (ref 1.13–1.32)
CALCIUM SERPL-MCNC: 6.2 MG/DL (ref 8.5–10.1)
CHLORIDE SERPL-SCNC: 119 MMOL/L (ref 97–108)
CO2 SERPL-SCNC: 18 MMOL/L (ref 21–32)
CREAT SERPL-MCNC: 0.82 MG/DL (ref 0.7–1.3)
CROSSMATCH RESULT,%XM: NORMAL
DIFFERENTIAL METHOD BLD: ABNORMAL
EOSINOPHIL # BLD: 0.1 K/UL (ref 0–0.4)
EOSINOPHIL NFR BLD: 1 % (ref 0–7)
ERYTHROCYTE [DISTWIDTH] IN BLOOD BY AUTOMATED COUNT: 23.6 % (ref 11.5–14.5)
GLUCOSE BLD STRIP.AUTO-MCNC: 124 MG/DL (ref 65–117)
GLUCOSE BLD STRIP.AUTO-MCNC: 162 MG/DL (ref 65–117)
GLUCOSE BLD STRIP.AUTO-MCNC: 163 MG/DL (ref 65–117)
GLUCOSE SERPL-MCNC: 128 MG/DL (ref 65–100)
HCT VFR BLD AUTO: 24.1 % (ref 36.6–50.3)
HGB BLD-MCNC: 7.5 G/DL (ref 12.1–17)
IMM GRANULOCYTES # BLD AUTO: 0 K/UL
IMM GRANULOCYTES NFR BLD AUTO: 0 %
LYMPHOCYTES # BLD: 0.6 K/UL (ref 0.8–3.5)
LYMPHOCYTES NFR BLD: 8 % (ref 12–49)
MAGNESIUM SERPL-MCNC: 2.1 MG/DL (ref 1.6–2.4)
MCH RBC QN AUTO: 27.6 PG (ref 26–34)
MCHC RBC AUTO-ENTMCNC: 31.1 G/DL (ref 30–36.5)
MCV RBC AUTO: 88.6 FL (ref 80–99)
METAMYELOCYTES NFR BLD MANUAL: 2 %
MONOCYTES # BLD: 0.7 K/UL (ref 0–1)
MONOCYTES NFR BLD: 9 % (ref 5–13)
MYELOCYTES NFR BLD MANUAL: 4 %
NEUTS BAND NFR BLD MANUAL: 14 % (ref 0–6)
NEUTS SEG # BLD: 6.1 K/UL (ref 1.8–8)
NEUTS SEG NFR BLD: 62 % (ref 32–75)
NRBC # BLD: 0.52 K/UL (ref 0–0.01)
NRBC BLD-RTO: 6.5 PER 100 WBC
PLATELET # BLD AUTO: 111 K/UL (ref 150–400)
PMV BLD AUTO: 9.9 FL (ref 8.9–12.9)
POTASSIUM SERPL-SCNC: 4.5 MMOL/L (ref 3.5–5.1)
RBC # BLD AUTO: 2.72 M/UL (ref 4.1–5.7)
RBC MORPH BLD: ABNORMAL
RBC MORPH BLD: ABNORMAL
SERVICE CMNT-IMP: ABNORMAL
SODIUM SERPL-SCNC: 144 MMOL/L (ref 136–145)
SPECIMEN EXP DATE BLD: NORMAL
STATUS OF UNIT,%ST: NORMAL
UNIT DIVISION, %UDIV: 0
WBC # BLD AUTO: 8 K/UL (ref 4.1–11.1)

## 2022-02-19 PROCEDURE — 36415 COLL VENOUS BLD VENIPUNCTURE: CPT

## 2022-02-19 PROCEDURE — 74011250636 HC RX REV CODE- 250/636: Performed by: INTERNAL MEDICINE

## 2022-02-19 PROCEDURE — 74011250636 HC RX REV CODE- 250/636: Performed by: HOSPITALIST

## 2022-02-19 PROCEDURE — 83735 ASSAY OF MAGNESIUM: CPT

## 2022-02-19 PROCEDURE — 65660000000 HC RM CCU STEPDOWN

## 2022-02-19 PROCEDURE — 80048 BASIC METABOLIC PNL TOTAL CA: CPT

## 2022-02-19 PROCEDURE — 74011000258 HC RX REV CODE- 258: Performed by: INTERNAL MEDICINE

## 2022-02-19 PROCEDURE — 74011250637 HC RX REV CODE- 250/637: Performed by: INTERNAL MEDICINE

## 2022-02-19 PROCEDURE — 82330 ASSAY OF CALCIUM: CPT

## 2022-02-19 PROCEDURE — 82962 GLUCOSE BLOOD TEST: CPT

## 2022-02-19 PROCEDURE — 30233N1 TRANSFUSION OF NONAUTOLOGOUS RED BLOOD CELLS INTO PERIPHERAL VEIN, PERCUTANEOUS APPROACH: ICD-10-PCS | Performed by: HOSPITALIST

## 2022-02-19 PROCEDURE — 85025 COMPLETE CBC W/AUTO DIFF WBC: CPT

## 2022-02-19 PROCEDURE — 74011636637 HC RX REV CODE- 636/637: Performed by: HOSPITALIST

## 2022-02-19 RX ORDER — CALCIUM GLUCONATE 20 MG/ML
1 INJECTION, SOLUTION INTRAVENOUS ONCE
Status: COMPLETED | OUTPATIENT
Start: 2022-02-19 | End: 2022-02-19

## 2022-02-19 RX ADMIN — AMPICILLIN SODIUM AND SULBACTAM SODIUM 3 G: 2; 1 INJECTION, POWDER, FOR SOLUTION INTRAMUSCULAR; INTRAVENOUS at 13:16

## 2022-02-19 RX ADMIN — AMPICILLIN SODIUM AND SULBACTAM SODIUM 3 G: 2; 1 INJECTION, POWDER, FOR SOLUTION INTRAMUSCULAR; INTRAVENOUS at 00:02

## 2022-02-19 RX ADMIN — CALCIUM GLUCONATE 1000 MG: 20 INJECTION, SOLUTION INTRAVENOUS at 11:39

## 2022-02-19 RX ADMIN — CALCIUM GLUCONATE 1000 MG: 20 INJECTION, SOLUTION INTRAVENOUS at 06:53

## 2022-02-19 RX ADMIN — MEGESTROL ACETATE 400 MG: 40 SUSPENSION ORAL at 09:18

## 2022-02-19 RX ADMIN — AMPICILLIN SODIUM AND SULBACTAM SODIUM 3 G: 2; 1 INJECTION, POWDER, FOR SOLUTION INTRAMUSCULAR; INTRAVENOUS at 18:38

## 2022-02-19 RX ADMIN — KETOROLAC TROMETHAMINE 15 MG: 30 INJECTION, SOLUTION INTRAMUSCULAR; INTRAVENOUS at 21:40

## 2022-02-19 RX ADMIN — AMPICILLIN SODIUM AND SULBACTAM SODIUM 3 G: 2; 1 INJECTION, POWDER, FOR SOLUTION INTRAMUSCULAR; INTRAVENOUS at 05:45

## 2022-02-19 RX ADMIN — KETOROLAC TROMETHAMINE 15 MG: 30 INJECTION, SOLUTION INTRAMUSCULAR; INTRAVENOUS at 09:12

## 2022-02-19 RX ADMIN — PREDNISONE 20 MG: 20 TABLET ORAL at 09:12

## 2022-02-19 RX ADMIN — FOLIC ACID 1 MG: 1 TABLET ORAL at 09:12

## 2022-02-19 NOTE — PROGRESS NOTES
Informed Dr. Adele Reis of patient's calcium level of 6.2 from 6.5.    0600 Ionized calcium level . 83 and Range 1.13- 1.32. Dr. Adele Reis informed.

## 2022-02-19 NOTE — PROGRESS NOTES
Varun Cisneros [facility]  [address]  [phone]      Medical Progress Note      NAME: Janelle Leary   :  1955  MRM:  [id number]    Date/Time of service: 2022         Subjective:     Chief Complaint:  Feeling better . No blood in stool . Says ankle is better . Denies any chest pain or SOB. .         Objective:       Vitals:       Last 24hrs VS reviewed since prior progress note. Most recent are:    Visit Vitals  /67 (BP 1 Location: Left upper arm, BP Patient Position: At rest)   Pulse 83   Temp 98.2 °F (36.8 °C)   Resp 18   Ht 5' 11\" (1.803 m)   Wt 85.7 kg (189 lb)   SpO2 100%   BMI 26.36 kg/m²     SpO2 Readings from Last 6 Encounters:   22 100%            Intake/Output Summary (Last 24 hours) at 2022 1058  Last data filed at 2022 0800  Gross per 24 hour   Intake 347.5 ml   Output 700 ml   Net -352.5 ml        Exam:     Physical Exam:    Gen:   in no acute distress  HEENT:  Pink conjunctivae, PERRL, hearing intact to voice  Resp:  No accessory muscle use, clear breath sounds without wheezes rales or rhonchi  Card:  RRR, No murmurs, normal S1, S2, no peripheral edema  Abd:  Soft, non-tender, non-distended, normoactive bowel sounds are present  Musc:  No cyanosis or clubbing.  Ankle swollen and painful  Skin:  No rashes or ulcers, skin turgor is good  Neuro:  Cranial nerves 3-12 are grossly intact, follows commands appropriately  Psych:  Oriented to person, place, and time, Alert with good insight      Medications Reviewed: (see below)    Lab Data Reviewed: (see below)    ______________________________________________________________________    Medications:     Current Facility-Administered Medications   Medication Dose Route Frequency    calcium gluconate 1 gram in sodium chloride (ISO-OSM) 50 mL infusion  1 g IntraVENous ONCE    0.9% sodium chloride infusion 250 mL  250 mL IntraVENous PRN    predniSONE (DELTASONE) tablet 20 mg  20 mg Oral DAILY    ketorolac (TORADOL) injection 15 mg  15 mg IntraVENous Q6H PRN    0.9% sodium chloride infusion  100 mL/hr IntraVENous CONTINUOUS    calcium carbonate (TUMS) chewable tablet 200 mg [elemental]  200 mg Oral PRN    ampicillin-sulbactam (UNASYN) 3 g in 0.9% sodium chloride (MBP/ADV) 100 mL MBP  3 g IntraVENous Q6H    0.9% sodium chloride infusion 250 mL  250 mL IntraVENous PRN    megestroL (MEGACE) 400 mg/10 mL (40 mg/mL) oral suspension 400 mg  400 mg Oral DAILY    folic acid (FOLVITE) tablet 1 mg  1 mg Oral DAILY    insulin lispro (HUMALOG) injection   SubCUTAneous AC&HS    glucose chewable tablet 16 g  4 Tablet Oral PRN    dextrose (D50W) injection syrg 12.5-25 g  12.5-25 g IntraVENous PRN    glucagon (GLUCAGEN) injection 1 mg  1 mg IntraMUSCular PRN    loperamide (IMODIUM) capsule 2 mg  2 mg Oral Q4H PRN    0.9% sodium chloride infusion 250 mL  250 mL IntraVENous PRN    acetaminophen (TYLENOL) tablet 650 mg  650 mg Oral Q6H PRN    Or    acetaminophen (TYLENOL) suppository 650 mg  650 mg Rectal Q6H PRN    polyethylene glycol (MIRALAX) packet 17 g  17 g Oral DAILY PRN    ondansetron (ZOFRAN ODT) tablet 4 mg  4 mg Oral Q8H PRN    Or    ondansetron (ZOFRAN) injection 4 mg  4 mg IntraVENous Q6H PRN          Lab Review:     Recent Labs     02/19/22  0359 02/18/22  0049 02/17/22  0026   WBC 8.0 8.0 7.3   HGB 7.5* 6.9* 7.3*   HCT 24.1* 22.8* 23.4*   * 111* 110*     Recent Labs     02/19/22  0359 02/18/22  0049 02/17/22  0026 02/16/22  1118 02/16/22  1118    142 144  --   --    K 4.5 4.2 4.0   < > 3.6   * 116* 118*  --   --    CO2 18* 20* 19*  --   --    * 128* 133*  --   --    BUN 16 15 16  --   --    CREA 0.82 0.97 0.93  --   --    CA 6.2* 6.5* 6.5*  --   --    MG 2.1  --  1.9  --  2.0    < > = values in this interval not displayed.      Lab Results   Component Value Date/Time    Glucose (POC) 155 (H) 02/18/2022 09:18 PM    Glucose (POC) 165 (H) 02/18/2022 05:32 PM Glucose (POC) 181 (H) 02/17/2022 09:20 PM    Glucose (POC) 161 (H) 02/17/2022 05:22 PM    Glucose (POC) 130 (H) 02/17/2022 12:02 PM          Assessment / Plan:     Fevers - unclear etiology. DDx includes proctitis, metastatic prostate cancer, blood transfusion, gastroenteritis, or acute gout flare. Infectious work up including UA chest unremarkable. Follow blood and stool cx. Ceftriaxone and flagyl changed to IV Unasyn. ID consulted. May be due to Gouty flare. Will discharge on Augmentin now afebrile    VT - suspect due to anemia, IVVD, illness etc. Check echo. Replete lytes PRN. Cardiology consulted. Has short run of Vtach 6 beats asymptomatic. Replace calcium and monitor. Echo  Left Ventricle: Left ventricle size is normal. Mildly increased wall thickness. Normal wall motion. Normal left ventricular systolic function. Normal diastolic function.   Left Atrium: Left atrium is moderately dilated. Loose stools / Proctitis: t, history of neuroendocrine tumors but he is reportedly receiving active therapy with octreotide so oncology doubts this is etiology of diarrhea. Diarrhea is chronic not acute. . Unclear if proctitis chronic or not; needs colonoscopy per GI but can be done here or back in [place]. C diff negative     Symptomatic Anemia / Thrombocytopenia - POA, likely due to chemotherapy and folic acid deficiency. No e/o overt GI bleed; occult stool neg. Folate level low;  oral folic acid. Hematology following. Transfused 1 unit on 2/18    Orthostatic dizziness / Hx HTN (hypertension) - POA due to IVVD, anemia. On no home medications at home. IVF.      Hx of gout/ Gout flare: Increase  oral steroids. No NSAIDs. Consult orthopedics. Hypokalemia - likely due to poor PO intake. Replete PRN.      Anorexia / Nausea / GERD (gastroesophageal reflux disease) / Hypoalbuminemia - POA due to chemo. IV Zofran PRN. Continue home megace. Prostate cancer metastatic to bone - Followed by oncology in [place].  Urology and oncology following; defer to oncology whether to get additional imaging with contrast while inpatient or to pursue OP.      Urinary incontinence - defer to urology      Diabetes - POA, hold oral meds. ISS.      Weakness - POA due to anemia, IVVD, chemo, cancer, deconditioning, etc. Fall precautions.   PT/OT eval      Total time spent with patient: 32 Minutes **I personally saw and examined the patient during this time period**                 Care Plan discussed with: Patient and Nursing Staff    Discussed:  Care Plan    Prophylaxis:  SCD's    Disposition:  Home w/Family           ___________________________________________________    Attending Physician: Matty Salmeron MD

## 2022-02-19 NOTE — PROGRESS NOTES
Verbal shift change report given to Mark RN (oncoming nurse) by [facility], RN (offgoing nurse). Report included the following information SBAR, Kardex, Intake/Output, MAR and Recent Results.

## 2022-02-19 NOTE — PROGRESS NOTES
Physician Progress Note      Sunita French  CSN #:                  [id number]  :                       1955  ADMIT DATE:       2/15/2022 9:12 PM  [address] [place] [place] DATE:  RESPONDING  PROVIDER #:        Vijaya MILLS DO          QUERY TEXT:    Dear attending,    Pt  has severe malnutrition documented per the dietician on 22. f possible, please document in progress notes and discharge summary if you are evaluating and /or treating any of the following: The medical record reflects the following:  Risk factors: Prostate cancer metastatic to bone  Clinical indicators: -Per dietary-  Patient states he has been unable to tolerate PO and has not had an appetite since starting chemo    6 months ago. Endorses constant N/V/D. Malnutrition Status:  Severe malnutrition  Context:  Chronic illness  Findings of the 6 clinical characteristics of malnutrition:  Energy Intake:  7 - 75% or less est energy requirements for 1 month or longer  Weight Loss:  7.00 - Greater than 10% over 6 months  Body Fat Loss:  1 - Mild body fat loss, Triceps  Muscle Mass Loss:  No significant muscle mass loss,  Fluid Accumulation:  No significant fluid accumulation,   Strength:  Not performed  Nutrition Diagnosis:  Severe malnutrition related to inadequate protein-energy intake,catabolic illness as evidenced by weight loss,intake 0-25%,poor intake prior to admission  Treatment: Monitor labwork, I&O, daily weights, dietary consult      Thank you,  Mdaelin Shoemaker RN, BSN  Clinical documentation Improvement  [phone]  Options provided:  -- Severe Malnutrition  -- Malnutrition, Please document degree of malnutrition if known.   -- Other - I will add my own diagnosis  -- Disagree - Not applicable / Not valid  -- Disagree - Clinically unable to determine / Unknown  -- Refer to Clinical Documentation Reviewer    PROVIDER RESPONSE TEXT:    This patient has an malnutrition    Query created by: Mario Alberto De Leon on 2022 1:02 PM      Electronically signed by:  Susana Meehan DO 2/19/2022 4:59 PM

## 2022-02-19 NOTE — PROGRESS NOTES
Problem: Patient Education: Go to Patient Education Activity  Goal: Patient/Family Education  Outcome: Progressing Towards Goal     Problem: Patient Education: Go to Patient Education Activity  Goal: Patient/Family Education  Outcome: Progressing Towards Goal     Problem: Risk for Spread of Infection  Goal: Prevent transmission of infectious organism to others  Description: Prevent the transmission of infectious organisms to other patients, staff members, and visitors. Outcome: Progressing Towards Goal     Problem: Patient Education:  Go to Education Activity  Goal: Patient/Family Education  Outcome: Progressing Towards Goal     Problem: Falls - Risk of  Goal: *Absence of Falls  Description: Document Marilee Almodovar Fall Risk and appropriate interventions in the flowsheet.   Outcome: Progressing Towards Goal  Note: Fall Risk Interventions:  Mobility Interventions: Communicate number of staff needed for ambulation/transfer              Elimination Interventions: Call light in reach,Toileting schedule/hourly rounds    History of Falls Interventions: Door open when patient unattended         Problem: Patient Education: Go to Patient Education Activity  Goal: Patient/Family Education  Outcome: Progressing Towards Goal     Problem: Nutrition Deficit  Goal: *Optimize nutritional status  Outcome: Progressing Towards Goal

## 2022-02-20 VITALS
TEMPERATURE: 97.9 F | DIASTOLIC BLOOD PRESSURE: 86 MMHG | OXYGEN SATURATION: 96 % | SYSTOLIC BLOOD PRESSURE: 134 MMHG | RESPIRATION RATE: 18 BRPM | HEART RATE: 100 BPM | WEIGHT: 189 LBS | BODY MASS INDEX: 26.46 KG/M2 | HEIGHT: 71 IN

## 2022-02-20 LAB
ALBUMIN SERPL-MCNC: 2.1 G/DL (ref 3.5–5)
ALBUMIN/GLOB SERPL: 0.6 {RATIO} (ref 1.1–2.2)
ALP SERPL-CCNC: 184 U/L (ref 45–117)
ALT SERPL-CCNC: 29 U/L (ref 12–78)
ANION GAP SERPL CALC-SCNC: 7 MMOL/L (ref 5–15)
AST SERPL-CCNC: 19 U/L (ref 15–37)
BASOPHILS # BLD: 0.1 K/UL (ref 0–0.1)
BASOPHILS NFR BLD: 1 % (ref 0–1)
BILIRUB SERPL-MCNC: 0.6 MG/DL (ref 0.2–1)
BLASTS NFR BLD MANUAL: 0 %
BUN SERPL-MCNC: 17 MG/DL (ref 6–20)
BUN/CREAT SERPL: 22 (ref 12–20)
CALCIUM SERPL-MCNC: 6.2 MG/DL (ref 8.5–10.1)
CHLORIDE SERPL-SCNC: 118 MMOL/L (ref 97–108)
CO2 SERPL-SCNC: 18 MMOL/L (ref 21–32)
CREAT SERPL-MCNC: 0.76 MG/DL (ref 0.7–1.3)
DIFFERENTIAL METHOD BLD: ABNORMAL
EOSINOPHIL # BLD: 0 K/UL (ref 0–0.4)
EOSINOPHIL NFR BLD: 0 % (ref 0–7)
ERYTHROCYTE [DISTWIDTH] IN BLOOD BY AUTOMATED COUNT: 23.2 % (ref 11.5–14.5)
GLOBULIN SER CALC-MCNC: 3.6 G/DL (ref 2–4)
GLUCOSE BLD STRIP.AUTO-MCNC: 109 MG/DL (ref 65–117)
GLUCOSE BLD STRIP.AUTO-MCNC: 118 MG/DL (ref 65–117)
GLUCOSE SERPL-MCNC: 110 MG/DL (ref 65–100)
HCT VFR BLD AUTO: 25.7 % (ref 36.6–50.3)
HGB BLD-MCNC: 7.8 G/DL (ref 12.1–17)
IMM GRANULOCYTES # BLD AUTO: 0 K/UL
IMM GRANULOCYTES NFR BLD AUTO: 0 %
LYMPHOCYTES # BLD: 0.7 K/UL (ref 0.8–3.5)
LYMPHOCYTES NFR BLD: 7 % (ref 12–49)
MAGNESIUM SERPL-MCNC: 2.2 MG/DL (ref 1.6–2.4)
MCH RBC QN AUTO: 27.7 PG (ref 26–34)
MCHC RBC AUTO-ENTMCNC: 30.4 G/DL (ref 30–36.5)
MCV RBC AUTO: 91.1 FL (ref 80–99)
METAMYELOCYTES NFR BLD MANUAL: 3 %
MONOCYTES # BLD: 0.6 K/UL (ref 0–1)
MONOCYTES NFR BLD: 6 % (ref 5–13)
MYELOCYTES NFR BLD MANUAL: 13 %
NEUTS BAND NFR BLD MANUAL: 7 % (ref 0–6)
NEUTS SEG # BLD: 6.7 K/UL (ref 1.8–8)
NEUTS SEG NFR BLD: 63 % (ref 32–75)
NRBC # BLD: 0.58 K/UL (ref 0–0.01)
NRBC BLD-RTO: 6.1 PER 100 WBC
OTHER CELLS NFR BLD MANUAL: 0 %
PLATELET # BLD AUTO: 118 K/UL (ref 150–400)
PMV BLD AUTO: 9.6 FL (ref 8.9–12.9)
POTASSIUM SERPL-SCNC: 4 MMOL/L (ref 3.5–5.1)
PROMYELOCYTES NFR BLD MANUAL: 0 %
PROT SERPL-MCNC: 5.7 G/DL (ref 6.4–8.2)
RBC # BLD AUTO: 2.82 M/UL (ref 4.1–5.7)
RBC MORPH BLD: ABNORMAL
RBC MORPH BLD: ABNORMAL
SERVICE CMNT-IMP: ABNORMAL
SERVICE CMNT-IMP: NORMAL
SODIUM SERPL-SCNC: 143 MMOL/L (ref 136–145)
WBC # BLD AUTO: 9.5 K/UL (ref 4.1–11.1)

## 2022-02-20 PROCEDURE — 74011250637 HC RX REV CODE- 250/637: Performed by: INTERNAL MEDICINE

## 2022-02-20 PROCEDURE — 83735 ASSAY OF MAGNESIUM: CPT

## 2022-02-20 PROCEDURE — 74011250636 HC RX REV CODE- 250/636: Performed by: HOSPITALIST

## 2022-02-20 PROCEDURE — 80053 COMPREHEN METABOLIC PANEL: CPT

## 2022-02-20 PROCEDURE — 74011636637 HC RX REV CODE- 636/637: Performed by: HOSPITALIST

## 2022-02-20 PROCEDURE — 74011000258 HC RX REV CODE- 258: Performed by: INTERNAL MEDICINE

## 2022-02-20 PROCEDURE — 85027 COMPLETE CBC AUTOMATED: CPT

## 2022-02-20 PROCEDURE — 82962 GLUCOSE BLOOD TEST: CPT

## 2022-02-20 PROCEDURE — 36415 COLL VENOUS BLD VENIPUNCTURE: CPT

## 2022-02-20 PROCEDURE — 74011250636 HC RX REV CODE- 250/636: Performed by: INTERNAL MEDICINE

## 2022-02-20 PROCEDURE — 74011250637 HC RX REV CODE- 250/637: Performed by: HOSPITALIST

## 2022-02-20 RX ORDER — CALCIUM GLUCONATE 20 MG/ML
2 INJECTION, SOLUTION INTRAVENOUS ONCE
Status: COMPLETED | OUTPATIENT
Start: 2022-02-20 | End: 2022-02-20

## 2022-02-20 RX ORDER — CALCIUM CARBONATE 500(1250)
1000 TABLET ORAL 2 TIMES DAILY WITH MEALS
Status: DISCONTINUED | OUTPATIENT
Start: 2022-02-20 | End: 2022-02-20 | Stop reason: HOSPADM

## 2022-02-20 RX ORDER — CALCIUM GLUCONATE 20 MG/ML
1 INJECTION, SOLUTION INTRAVENOUS ONCE
Status: DISCONTINUED | OUTPATIENT
Start: 2022-02-20 | End: 2022-02-20

## 2022-02-20 RX ORDER — CALCIUM CARBONATE 500(1250)
2 TABLET ORAL 2 TIMES DAILY WITH MEALS
Qty: 14 TABLET | Refills: 0 | Status: SHIPPED | OUTPATIENT
Start: 2022-02-20

## 2022-02-20 RX ORDER — AMOXICILLIN AND CLAVULANATE POTASSIUM 875; 125 MG/1; MG/1
1 TABLET, FILM COATED ORAL 2 TIMES DAILY
Qty: 12 TABLET | Refills: 0 | Status: SHIPPED | OUTPATIENT
Start: 2022-02-20

## 2022-02-20 RX ADMIN — SODIUM CHLORIDE 100 ML/HR: 9 INJECTION, SOLUTION INTRAVENOUS at 05:40

## 2022-02-20 RX ADMIN — CALCIUM 1000 MG: 500 TABLET ORAL at 11:49

## 2022-02-20 RX ADMIN — AMPICILLIN SODIUM AND SULBACTAM SODIUM 3 G: 2; 1 INJECTION, POWDER, FOR SOLUTION INTRAMUSCULAR; INTRAVENOUS at 11:50

## 2022-02-20 RX ADMIN — FOLIC ACID 1 MG: 1 TABLET ORAL at 09:25

## 2022-02-20 RX ADMIN — AMPICILLIN SODIUM AND SULBACTAM SODIUM 3 G: 2; 1 INJECTION, POWDER, FOR SOLUTION INTRAMUSCULAR; INTRAVENOUS at 05:40

## 2022-02-20 RX ADMIN — MEGESTROL ACETATE 400 MG: 40 SUSPENSION ORAL at 09:27

## 2022-02-20 RX ADMIN — CALCIUM GLUCONATE 2 G: 20 INJECTION, SOLUTION INTRAVENOUS at 10:58

## 2022-02-20 RX ADMIN — KETOROLAC TROMETHAMINE 15 MG: 30 INJECTION, SOLUTION INTRAMUSCULAR; INTRAVENOUS at 09:25

## 2022-02-20 RX ADMIN — PREDNISONE 20 MG: 20 TABLET ORAL at 09:25

## 2022-02-20 RX ADMIN — AMPICILLIN SODIUM AND SULBACTAM SODIUM 3 G: 2; 1 INJECTION, POWDER, FOR SOLUTION INTRAMUSCULAR; INTRAVENOUS at 01:10

## 2022-02-20 NOTE — DISCHARGE SUMMARY
[address] SUMMARY    Name:  Anitra Pena  MR#:  [id number]  :  1955  ACCOUNT #:  [de-identified]  ADMIT DATE:  02/15/2022  DISCHARGE DATE:  2022      ADMISSION DIAGNOSES:  1. Anemia. 2.  Hypocalcemia. DISCHARGE DIAGNOSES:  1. Anemia. 2.  Hypocalcemia. HISTORY OF PRESENT ILLNESS:  The patient is a 14-year-old gentleman, who lives in [place], presented to the emergency room due to complaints of dizziness which has started 10 days prior to admission. The patient has recently been started on chemotherapy and has received two cycles of chemo. He was found to be anemic in the emergency room. HOSPITAL COURSE:  The patient was admitted and was transfused. During this hospital stay, he was seen by Urology, who recommended supportive treatment. No acute need for urology to intervene. The patient was also seen by Oncology for his anemia. It was thought that the patient has anemia due to antineoplastic chemotherapy. His anemia improved after red blood cell transfusion. The patient's hemoglobin on day of discharge is 7.8. The patient's hemoglobin at the time of admission was 5.1. During his hospital stay, he was also found to have fever. Infectious Disease was consulted, Dr. Diane Avery saw him. WBC count was within normal limits. The patient was not neutropenic. His urine analysis was unremarkable. Blood cultures were unremarkable. It was thought that the patient possibly have proctitis related fever. The patient was empirically started on Unasyn. Since then, he has no fever. His CT scan of abdomen and pelvis showed thickening of the rectum with presacral edema compatible with proctitis. The patient was also found to be hypocalcemic and was given calcium chloride. During the hospital stay, the patient was also seen by Cardiology for a 12-beat run of ventricular tachycardia. It was thought that this is due to electrolyte imbalance.   An echocardiogram was obtained on , which showed left ventricular size is normal with mild increased wall thickness, normal diastolic function, normal wall motion. The patient also had a Doppler exam done which was negative for DVT. The patient at this time wants to go back as he has an appointment with his oncologist tomorrow. He feels otherwise fine. On the day of discharge, his CBC reveals a white count of 9.5, hemoglobin of 7.8, hematocrit of 25.7, and platelet count of 773,625. He is afebrile. On the day of discharge, he is given 2 g of calcium chloride. The patient was found to have a gouty flare-up. His prednisone dose was increased and his gout got better. DISCHARGE MEDICATIONS:  His discharge medications will include the followin. Zofran p.r.n.  2.  Megace 400 mg daily. 3.  Prednisone 5 mg daily. 4.  Augmentin 875/125 b.i.d. for 6 more days. 5.  Calcium supplement. 1 G twice daily    DISCHARGE INSTRUCTIONS:      The patient needs to follow up with oncologist tomorrow with lab work including calcium level. He will need further workup for hypocalcemia. 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 CONDITION ON DISCHARGE:  Stable.       Landon Ruiz MD      [id number]  D:  2022 9:57  T:  2022 11:05  JOB #:  [id number]

## 2022-02-20 NOTE — DISCHARGE INSTRUCTIONS
Discharge Instructions       PATIENT ID: Virgilio Ahuja  MRN: [id number]   YOB: 1955    DATE OF ADMISSION: 2/15/2022  9:12 PM    DATE OF DISCHARGE: 2/20/2022    PRIMARY CARE PROVIDER: Jorden Wolf MD     ATTENDING PHYSICIAN: Tricia Wallis MD  DISCHARGING PROVIDER: Jeannette Sparks MD    To contact this individual call [phone] and ask the  to page. If unavailable ask to be transferred the Adult Hospitalist Department. DISCHARGE DIAGNOSES and CARE RECOMMENDATIONS: Anemia        DIET: Regular Diet      ACTIVITY: Activity as tolerated    WOUND CARE:       PENDING TEST RESULTS:   At the time of discharge the following test results are still pending:     FOLLOW UP APPOINTMENTS:   Follow-up Information     Follow up With Specialties Details Why Contact Info    Jorden Wolf MD  In 3 days  Patient can only remember the practice name and not the physician               YOU WERE SEEN BY THE FOLLOWING CONSULTATIONS:   IP CONSULT TO [address] [id number]    Follow up with   Oncology, Cardiology and PCP in 2-3 days for follow up on anemia and Low calcium               DISCHARGE MEDICATIONS:   See Medication Reconciliation Form    · It is important that you take the medication exactly as they are prescribed. · Keep your medication in the bottles provided by the pharmacist and keep a list of the medication names, dosages, and times to be taken in your wallet. · Do not take other medications without consulting your doctor. NOTIFY YOUR PHYSICIAN FOR ANY OF THE FOLLOWING:   Fever over 101 degrees for 24 hours. Chest pain, shortness of breath, fever, chills, nausea, vomiting, diarrhea, change in mentation, falling, weakness, bleeding. Severe pain or pain not relieved by medications.   Or, any other signs or symptoms that you may have questions about.      Services you need on discharge***     HOME HEALTH NURSE: MEDICATIONS CHECK,WOUND CARE ETC...     [facility] VISIT     DISPATCH HEALTH          Signed:   Pratibha Martinez MD  2/20/2022  10:07 AM

## 2022-02-20 NOTE — PROGRESS NOTES
Pharmacist Discharge Medication Reconciliation    Discharge Provider:  Kimberly Ortiz     Discharge Medications:      My Medications        START taking these medications        Instructions Each Dose to Equal Morning Noon Evening Bedtime   amoxicillin-clavulanate 875-125 mg per tablet  Commonly known as: Augmentin    Your last dose was: Your next dose is: Take 1 Tablet by mouth two (2) times a day. 1 Tablet                 calcium carbonate 500 mg calcium (1,250 mg) tablet  Commonly known as: OS-TRACEE    Your last dose was: Your next dose is: Take 2 Tablets by mouth two (2) times daily (with meals). 2 Tablet                        CONTINUE taking these medications        Instructions Each Dose to Equal Morning Noon Evening Bedtime   calcium-cholecalciferol (D3) tablet  Commonly known as: CALTRATE 600+D    Your last dose was: Your next dose is: Take 1 Tablet by mouth daily. 1 Tablet                 megestroL 400 mg/10 mL (40 mg/mL) suspension  Commonly known as: MEGACE    Your last dose was: Your next dose is: Take 400 mg by mouth daily. 400 mg                 ondansetron hcl 4 mg tablet  Commonly known as: ZOFRAN    Your last dose was: Your next dose is: Take 4 mg by mouth every eight (8) hours as needed for Nausea or Nausea or Vomiting. 4 mg                 predniSONE 1 mg tablet  Commonly known as: DELTASONE    Your last dose was: Your next dose is: Take 5 mg by mouth daily. 5 mg                        ASK your doctor about these medications        Instructions Each Dose to Equal Morning Noon Evening Bedtime   ibuprofen 600 mg tablet  Commonly known as: MOTRIN    Your last dose was: Your next dose is: Take 600 mg by mouth two (2) times a day. PRN   600 mg                           Where to Get Your Medications        Information on where to get these meds will be given to you by the nurse or doctor.     Ask your nurse or doctor about these medications  amoxicillin-clavulanate 875-125 mg per tablet  calcium carbonate 500 mg calcium (1,250 mg) tablet           The patient's chart, MAR, and AVS were reviewed by   Sydnee Hernandez,   Contact: [phone]

## 2022-02-20 NOTE — PROGRESS NOTES
LAte labs result had a critical calcium of 6.2; attempted to reach On Call Nataly Hammer but was not able to ; Hand off to incoming RN aware      Bedside and Verbal shift change report given to [address]) by Burke Rae (offgoing nurse). Report included the following information SBAR, Kardex, Intake/Output, MAR and Recent Results.

## 2022-02-20 NOTE — PROGRESS NOTES
2/20/22  10:23 AM    Care Manager noted discharge order. Met with patient to deliver 2nd IM letter. Patient discharging home today and driving back to [address] discussed following up with his PCP to obtain an order for PT. Medicare pt has received, reviewed, and signed 2nd IM letter informing them of their right to appeal the discharge. Signed copy has been placed on pt bedside chart. [facility] Management Interventions  PCP Verified by CM:  Yes Sal Olmos MD last visit 2 weeks)  Palliative Care Criteria Met (RRAT>21 & CHF Dx)?: No  Mode of Transport at Discharge: Self (Daughter)  Physical Therapy Consult: Yes  Occupational Therapy Consult: Yes  Speech Therapy Consult: No  Support Systems: Spouse/Significant Other,Child(harriett)  Confirm Follow Up Transport: Family  Discharge Location  Patient Expects to be Discharged to[de-identified] Home with outpatient services

## 2022-02-21 LAB
BACTERIA SPEC CULT: NORMAL
SERVICE CMNT-IMP: NORMAL

## 2022-02-21 NOTE — ADT AUTH CERT NOTES
Anemia, Iron Deficiency or Unspecified - Care Day (2/17/2022) by Jerome Coronel RN       Review Status Review Entered   Completed 2/17/2022 12:37      Criteria Review      Care Day: 3 Care Date: 2/17/2022 Level of Care: Inpatient Floor    Guideline Day 2    Clinical Status    ( ) * Hemodynamic stability    ( ) * Mental status at baseline    ( ) * Active blood loss absent    ( ) * Signs and symptoms of anemia absent or improved    ( ) * Hgb/Hct level stable and acceptable for next level of care    ( ) * Etiology of anemia requiring inpatient care absent    ( ) * Discharge plans and education understood    Activity    ( ) * Ambulatory or acceptable for next level of care    Routes    ( ) * Oral hydration    ( ) * Oral medications or regimen acceptable for next level of care    ( ) * Oral diet or acceptable for next level of care    * Milestone   Additional Notes   2/17/2022      ATTENDING NOTE:      Assessment/Plan       1. VT: 12-beat run yesterday evening, HR also elevated at 1 time w/ activity to 150's - appeared ST. Replete lytes/hydrate d/t diarrhea. Check TTE since pt w/ some CIFUENTES, pBNP elevated to 3805. Pt is from [place] and will return on d/c so will hold on 30 day event monitor       2. Fever unknown origin: having episodes of diarrhea, lactate elevated 2/16. On abx - ID following        3. HTN: not on meds since he lost sig amount of weight, BP low        4. Hx of metastatic prostate CA: on chemo, heme/onc following       5. Anemia/thrombocytopenia: likely d/t chemo. Occult stool neg       ECHO ordered       Vitals:     02/17/22 0459 02/17/22 0700 02/17/22 0831 02/17/22 0937   BP: 100/66   121/70     Pulse: 88 89 99     Resp: 18   18     Temp: 98.7 °F (37.1 °C)   98.7 °F (37.1 °C)     SpO2: 97%   99%     Weight:           Height:       5' 10.98\" (1.803 m)           Intake and Output:   Current Shift: No intake/output data recorded.    Last three shifts: 02/15 1901 - 02/17 0700   In: 2709.4 [P.O.:640; I.V.:1150] Out: 400 [Urine:400]                   Gen:   Well-developed, well-nourished, in no acute distress   Neck: Supple,No JVD, No Carotid Bruit,    Resp: No accessory muscle use, Clear breath sounds, No rales or rhonchi   Card:  Regular Rate,Rythm,Normal S1, S2, No murmurs, rubs or gallop. No thrills.     Abd:  Soft, non-tender, non-distended,BS+,    MSK:  No cyanosis   Skin:  No rashes     Neuro: moving all four extremities , follows commands appropriately   Psych:  Good insight, oriented to person, place , alert, Nml Affect   LE: No edema       EKG: normal EKG, normal sinus rhythm, nonspecific ST and T waves changes.        TELEMETRY SR       MEDS:   Current Facility-Administered Medications   Medication Dose Route Frequency   · calcium carbonate (TUMS) chewable tablet 200 mg [elemental] 200 mg Oral PRN   · ampicillin-sulbactam (UNASYN) 3 g in 0.9% sodium chloride (MBP/ADV) 100 mL MBP 3 g IntraVENous Q6H   · magnesium sulfate 2 g/50 ml IVPB (premix or compounded) 2 g IntraVENous ONCE   · 0.9% sodium chloride infusion 250 mL 250 mL IntraVENous PRN   · megestroL (MEGACE) 400 mg/10 mL (40 mg/mL) oral suspension 400 mg 400 mg Oral DAILY   · predniSONE (DELTASONE) tablet 5 mg 5 mg Oral DAILY   · folic acid (FOLVITE) tablet 1 mg 1 mg Oral DAILY   · insulin lispro (HUMALOG) injection SubCUTAneous AC&HS   · glucose chewable tablet 16 g 4 Tablet Oral PRN   · dextrose (D50W) injection syrg 12.5-25 g 12.5-25 g IntraVENous PRN   · glucagon (GLUCAGEN) injection 1 mg 1 mg IntraMUSCular PRN   · loperamide (IMODIUM) capsule 2 mg 2 mg Oral Q4H PRN   · 0.9% sodium chloride infusion 250 mL 250 mL IntraVENous PRN   · 0.9% sodium chloride with KCl 40 mEq/L infusion IntraVENous CONTINUOUS   · acetaminophen (TYLENOL) tablet 650 mg 650 mg Oral Q6H PRN     Or   · acetaminophen (TYLENOL) suppository 650 mg 650 mg Rectal Q6H PRN   · polyethylene glycol (MIRALAX) packet 17 g 17 g Oral DAILY PRN   · ondansetron (ZOFRAN ODT) tablet 4 mg 4 mg Oral Q8H PRN     Or   · ondansetron (ZOFRAN) injection 4 mg 4 mg IntraVENous Q6H PRN      LABS:      2/17/2022 00:26   WBC: 7.3   NRBC: 7.0 (H)   RBC: 2.55 (L)   HGB: 7.3 (L)   HCT: 23.4 (L)   MCV: 91.8   MCH: 28.6   MCHC: 31.2   RDW: 21.2 (H)   PLATELET: 443 (L)   MPV: 10.7   NEUTROPHILS: 63   BAND NEUTROPHILS: 5   LYMPHOCYTES: 11 (L)   MONOCYTES: 8   EOSINOPHILS: 0   BASOPHILS: 0   IMMATURE GRANULOCYTES: 0   METAMYELOCYTES: 4 (H)   MYELOCYTES: 9 (H)   DF: MANUAL   ABSOLUTE NRBC: 0.51 (H)   ABS. NEUTROPHILS: 5.0   ABS. IMM. GRANS.: 0.0   ABS. LYMPHOCYTES: 0.8   ABS. MONOCYTES: 0.6   ABS. EOSINOPHILS: 0.0   ABS. BASOPHILS: 0.0   RBC COMMENTS: TEARDROP CELLS. .. Sodium: 144   Potassium: 4.0   Chloride: 118 (H)   CO2: 19 (L)   Anion gap: 7   Glucose: 133 (H)   BUN: 16   Creatinine: 0.93   BUN/Creatinine ratio: 17   Calcium: 6.5 (L)   Magnesium: 1.9   GFR est non-AA: >60   GFR est AA: >60           Anemia, Iron Deficiency or Unspecified - Care Day  (2/16/2022) by Maci Hall RN       Review Status Review Entered   Completed 2/16/2022 13:30      Criteria Review      Care Day: 2 Care Date: 2/16/2022 Level of Care: Inpatient Floor    Guideline Day 2    Clinical Status    ( ) * Hemodynamic stability    ( ) * Mental status at baseline    ( ) * Active blood loss absent    ( ) * Signs and symptoms of anemia absent or improved    ( ) * Hgb/Hct level stable and acceptable for next level of care    ( ) * Etiology of anemia requiring inpatient care absent    ( ) * Discharge plans and education understood    Activity    ( ) * Ambulatory or acceptable for next level of care    Routes    ( ) * Oral hydration    ( ) * Oral medications or regimen acceptable for next level of care    ( ) * Oral diet or acceptable for next level of care    * Milestone   Additional Notes   2/16/2022      ATTENDING NOTE:       Patient was personally seen and examined by me during this time period.  Chart reviewed. F/u anemia.  Reports no overt bleeding, some light headedness, some pain of ankle joints. Assessment / Plan:       Symptomatic Anemia / Thrombocytopenia - POA, likely due to chemotherapy and folic acid deficiency. No e/o overt GI bleed; check occult stool. Folate level low; start oral folic acid. Consult oncology.       Orthostatic dizziness / Hx HTN (hypertension) - POA due to IVVD, anemia.  On no home medications at home. IVF.        Hx of gout/ Gout flare: resume oral steroids. No NSAIDs. Consult orthopedics.        Hypokalemia - likely due to poor PO intake. Replete PRN.        Anorexia / Nausea / GERD (gastroesophageal reflux disease) / Hypoalbuminemia - POA due to chemo. IV Zofran PRN. Continue home megace.        Prostate cancer metastatic to bone - Followed by oncology in [place]. Consult urology and oncology; patient and family are concerned he needs further imaging today.        Urinary incontinence - defer to oncology       Diabetes - POA, hold oral meds.  ISS.        Weakness - POA due to anemia, IVVD, chemo, cancer, deconditioning, etc. Fall precautions.  PT/OT eval      Total time spent with patient: 32 Minutes   **I personally saw and examined the patient during this time period**                                                                                                                   Care Plan discussed with: Patient and Nursing Staff       Discussed:  Care Plan       Prophylaxis:  SCD's       Disposition:  Home w/Family      Visit Vitals   BP (!) 103/58 (BP 1 Location: Right upper arm, BP Patient Position: At rest;Semi fowlers)   Pulse 83   Temp 98.9 °F (37.2 °C)   Resp 18   Ht 5' 11\" (1.803 m)   Wt 86.2 kg (190 lb)   SpO2 94%   BMI 26.50 kg/m²       Physical Exam:   Gen:  Well-developed, well-nourished, in no acute distress   HEENT:  Pink conjunctivae, PERRL, hearing intact to voice   Resp:  No accessory muscle use, clear breath sounds without wheezes rales or rhonchi   Card:  RRR, No murmurs, normal S1, S2, no peripheral edema Abd:  Soft, non-tender, non-distended, normoactive bowel sounds are present, no palpable organomegaly    Lymph:  No cervical adenopathy   Musc:  No cyanosis or clubbing   Skin:  No rashes or ulcers, skin turgor is good   Neuro:  Cranial nerves 3-12 are grossly intact, follows commands appropriately   Psych:  Oriented to person, place, and time, Alert with good insight      Current Facility-Administered Medications   Medication Dose Route Frequency   · 0.9% sodium chloride infusion 250 mL 250 mL IntraVENous PRN   · [START ON 2/17/2022] megestroL (MEGACE) 400 mg/10 mL (40 mg/mL) oral suspension 400 mg 400 mg Oral DAILY   · [START ON 2/17/2022] predniSONE (DELTASONE) tablet 5 mg 5 mg Oral DAILY   · folic acid (FOLVITE) tablet 1 mg 1 mg Oral DAILY   · 0.9% sodium chloride infusion 250 mL 250 mL IntraVENous PRN   · 0.9% sodium chloride with KCl 40 mEq/L infusion IntraVENous CONTINUOUS   · acetaminophen (TYLENOL) tablet 650 mg 650 mg Oral Q6H PRN     Or   · acetaminophen (TYLENOL) suppository 650 mg 650 mg Rectal Q6H PRN   · polyethylene glycol (MIRALAX) packet 17 g 17 g Oral DAILY PRN   · ondansetron (ZOFRAN ODT) tablet 4 mg 4 mg Oral Q8H PRN     Or   · ondansetron (ZOFRAN) injection 4 mg 4 mg IntraVENous Q6H PRN           LABS:       2/16/2022 00:33   Color: DARK YELLOW   Appearance: CLOUDY (A)   Specific gravity: 1.023   pH (UA): 5.5   Protein: 100 (A)   Glucose: Negative   Ketone: TRACE (A)   Blood: Negative   Bilirubin UA, confirm: Positive (A)   Urobilinogen: 1.0   Nitrites: Negative   Leukocyte Esterase: TRACE (A)   Epithelial cells: FEW   WBC: 20-50   RBC: 0-5   Bacteria: Negative   Hyaline cast: 5-10   WBC cast: 5-10 (A)   Granular cast: 10-20 (A)      2/16/2022 11:18   HGB: 7.7 (L)   HCT: 24.9 (L)   Potassium: 3.6           Anemia, Iron Deficiency or Unspecified - Care Day  (2/15/2022) by Maci Hall RN       Review Status Review Entered   Completed 2/16/2022 09:37      Criteria Review      Care Day: 1 Care Date: 2/15/2022 Level of Care: Inpatient Floor    Guideline Day 1    Level Of Care    (X) ICU or floor    2/16/2022 09:37:08 EST by Pari      Remote Telemetry    Clinical Status    ( ) * Clinical Indications met    Activity    (X) Activity as tolerated    2/16/2022 09:37:08 EST by Pari      Activity as tolerated with assistance    Routes    (X) Diet as tolerated    2/16/2022 09:37:08 EST by Pari      Regular diet    (X) Possible IV fluids    2/16/2022 09:37:08 EST by Glenn BEYER with 40KCL @ 125ml/hr    Interventions    (X) Laboratory tests    2/16/2022 09:37:08 EST by Alonzo Myrick- 5.1  HCT- 16.9   K- 2.8    (X) Possible transfusion    2/16/2022 09:37:08 EST by Pari      2 Units PRBC    * Milestone   Additional Notes   2/15/2022      ED COURSE:       Kate James is a 78 y.o. male who comes in as above. Work-up remarkable for hemoglobin of 6.4 (microcytic), which explains  the patient's symptoms. I was able to review his most recent hemoglobin from an outside hospital which was collected less than 1 year ago it was 13.0. I suspect is due to being in the brady of his chemotherapeutic regimen that he is only received 2 doses of.       Patient will receive 1 unit of PRBCs and will be admitted to medicine for further management. Working Diagnosis:    1. Symptomatic anemia    2. Near syncope       Review of Systems    Constitutional: Positive for activity change, appetite change and fatigue. HENT: Negative for sore throat.     Eyes: Negative for visual disturbance. Respiratory: Positive for shortness of breath.     Cardiovascular: Negative for chest pain. Gastrointestinal: Positive for nausea and vomiting. Negative for abdominal pain and blood in stool. Genitourinary: Negative for dysuria. Musculoskeletal: Negative for myalgias. Skin: Negative for rash. Neurological: Positive for dizziness and light-headedness. Psychiatric/Behavioral: Negative for dysphoric mood.            Vitals:     02/15/22 2141 02/15/22 2154 02/15/22 2202 02/15/22 2203   BP: (!) 91/58   (!) 102/57 119/68   Pulse:     90 (!) 114   Resp:     20 24   Temp:           SpO2:   100%       Weight:           Height:                       Physical Exam   Vitals and nursing note reviewed. Constitutional:        General: He is not in acute distress.      Appearance: Normal appearance. He is not ill-appearing. HENT:       Head: Normocephalic and atraumatic.       Nose: Nose normal.       Mouth/Throat:       Mouth: Mucous membranes are dry.       Pharynx: Oropharynx is clear. Eyes:       Extraocular Movements: Extraocular movements intact. Cardiovascular:       Rate and Rhythm: Regular rhythm. Tachycardia present.       Pulses: Normal pulses.            Radial pulses are 2+ on the right side and 2+ on the left side.         Dorsalis pedis pulses are 2+ on the right side and 2+ on the left side.       Heart sounds: No murmur heard. No friction rub. No gallop.        Comments: Trace edema bilaterally    Pulmonary:       Effort: Tachypnea present.       Breath sounds: Normal breath sounds and air entry. No wheezing or rales. Abdominal:       General: Bowel sounds are normal. There is no distension.       Palpations: Abdomen is soft.       Tenderness: There is no abdominal tenderness. Musculoskeletal:          General: Normal range of motion.       Cervical back: Normal range of motion and neck supple. Skin:      General: Skin is warm and dry.       Coloration: Skin is pale. Neurological:       Mental Status: He is alert and oriented to person, place, and time. Mental status is at baseline.     Psychiatric:          Mood and Affect: Mood normal.          Behavior: Behavior normal.        ADMISSION:       Mr. Marc Church is a 78 y.o. [facility] male who presented to the Emergency Department complaining of dizziness.  He just completed his 2nd chemo for worsening prostate cancer 2 weeks ago in [place], before Donovan in [address] [place] [address] finds anemia.  We will admit him for observation. Assessment and Plan:       Anemia / Thrombocytopenia - POA, presumed due to chemo.         Orthostatic dizziness / Hx HTN (hypertension) - POA due to IVVD, anemia and overmedication.  Hydrate and hold HCTZ, valsartan and norvasc.  Continue coreg.       Hypokalemia - Related to IVVD and anorexia. Replete and monitor.       Anorexia / Nausea / GERD (gastroesophageal reflux disease) / Hypoalbuminemia - POA due to chemo  Check for infection, procalcitonin       Prostate cancer metastatic to bone - Followed by oncology in [address] [place] [address]       Urinary incontinence - Followed by urology       Diabetes - POA, usually on metformin. Until eating I will hold off SSI per protocol.  Hold off home metformin.  Check A1c.       Weakness - POA due to anemia, IVVD, chemo, cancer, deconditioning, etc. Fall precautions.  PT POT eval       Telemetry reviewed:   normal sinus rhythm       Risk of deterioration: high          Visit Vitals   /68 (BP Patient Position: Standing)   Pulse (!) 114   Temp 98.4 °F (36.9 °C)   Resp 24   Ht 5' 11\" (1.803 m)   Wt 86.2 kg (190 lb)   SpO2 100%   BMI 26.50 kg/m²       Physical Exam:       Gen:  Well-developed, well-nourished, in no acute distress   HEENT:  Pink conjunctivae, PERRL, hearing intact to voice, moist mucous membranes   Neck:  Supple, without masses, thyroid non-tender   Resp:  No accessory muscle use, clear breath sounds without wheezes rales or rhonchi   Card:  No murmurs, tachycardic S1, S2 without thrills, bruits or peripheral edema   Abd:  Soft, non-tender, non-distended, normoactive bowel sounds are present, no mass   Lymph:  No cervical or inguinal adenopathy   Musc:  No cyanosis or clubbing   Skin:  No rashes or ulcers, skin turgor is good   Neuro:  Cranial nerves are grossly intact, general motor weakness, follows commands    Psych:  Good insight, oriented to person, place and time, alert      LABS:       2/15/2022 22:00   WBC: 7.9   NRBC: 8.8 (H)   RBC: 2.24 (L)   HGB: 6.4 (L)   HCT: 20.8 (L)   MCV: 92.9   MCH: 28.6   MCHC: 30.8   RDW: 21.1 (H)   PLATELET: 433 (L)   MPV: 10.0   NEUTROPHILS: 62   BAND NEUTROPHILS: 9 (H)   LYMPHOCYTES: 9 (L)   MONOCYTES: 9   EOSINOPHILS: 0   BASOPHILS: 0   IMMATURE GRANULOCYTES: 0   METAMYELOCYTES: 7 (H)   MYELOCYTES: 4 (H)   DF: MANUAL   ABSOLUTE NRBC: 0.69 (H)   ABS. NEUTROPHILS: 5.6   ABS. IMM. GRANS.: 0.0   ABS. LYMPHOCYTES: 0.7 (L)   ABS. MONOCYTES: 0.7   ABS. EOSINOPHILS: 0.0   ABS. BASOPHILS: 0.0   RBC COMMENTS: ANISOCYTOSIS. .. Sodium: 141   Potassium: 3.0 (L)   Chloride: 111 (H)   CO2: 22   Anion gap: 8   Glucose: 174 (H)   BUN: 15   Creatinine: 1.10   BUN/Creatinine ratio: 14   Calcium: 6.6 (L)   Magnesium: 1.7   GFR est non-AA: >60   GFR est AA: >60   Bilirubin, total: 1.3 (H)   Protein, total: 6.7   Albumin: 2.8 (L)   Globulin: 3.9   A-G Ratio: 0.7 (L)   ALT: 27   AST: 20   Alk.  phosphatase: 149 (H)   Troponin-High Sensitivity: 23   NT pro-BNP: 3,805 (H)           Anemia, Iron Deficiency or Unspecified - Clinical Indications for Admission to Inpatient Care by Cliff Olvera RN       Review Status Review Entered   Completed 2/16/2022 09:33      Criteria Review      Clinical Indications for Admission to Inpatient Care    Most Recent : Cliff Olvera Most Recent Date: 2/16/2022 09:33:08 EST

## 2022-03-18 PROBLEM — D64.81 ANEMIA DUE TO ANTINEOPLASTIC CHEMOTHERAPY: Status: ACTIVE | Noted: 2022-02-15

## 2022-03-18 PROBLEM — T45.1X5A ANEMIA DUE TO ANTINEOPLASTIC CHEMOTHERAPY: Status: ACTIVE | Noted: 2022-02-15

## 2022-03-18 PROBLEM — E53.8 FOLATE DEFICIENCY: Status: ACTIVE | Noted: 2022-02-16

## 2022-03-19 PROBLEM — R19.5 LOOSE STOOLS: Status: ACTIVE | Noted: 2022-02-16

## 2022-03-19 PROBLEM — E87.6 HYPOKALEMIA: Status: ACTIVE | Noted: 2022-02-15

## 2022-03-19 PROBLEM — E88.09 HYPOALBUMINEMIA: Status: ACTIVE | Noted: 2022-02-15

## 2022-03-19 PROBLEM — T45.1X5A CHEMOTHERAPY-INDUCED NAUSEA: Status: ACTIVE | Noted: 2022-02-15

## 2022-03-19 PROBLEM — D64.9 ANEMIA: Status: ACTIVE | Noted: 2022-02-17

## 2022-03-19 PROBLEM — R11.0 CHEMOTHERAPY-INDUCED NAUSEA: Status: ACTIVE | Noted: 2022-02-15

## 2022-03-19 PROBLEM — R42 ORTHOSTATIC DIZZINESS: Status: ACTIVE | Noted: 2022-02-15

## 2022-03-19 PROBLEM — R53.1 WEAKNESS: Status: ACTIVE | Noted: 2022-02-15

## 2022-03-19 PROBLEM — I95.9 HYPOTENSION: Status: ACTIVE | Noted: 2022-02-15

## 2022-03-20 PROBLEM — R63.0 ANOREXIA: Status: ACTIVE | Noted: 2022-02-15

## 2023-05-14 RX ORDER — PREDNISONE 1 MG/1
5 TABLET ORAL DAILY
COMMUNITY

## 2023-05-14 RX ORDER — AMOXICILLIN AND CLAVULANATE POTASSIUM 875; 125 MG/1; MG/1
1 TABLET, FILM COATED ORAL 2 TIMES DAILY
COMMUNITY
Start: 2022-02-20

## 2023-05-14 RX ORDER — MEGESTROL ACETATE 40 MG/ML
400 SUSPENSION ORAL DAILY
COMMUNITY

## 2023-05-14 RX ORDER — IBUPROFEN 200 MG
1000 CAPSULE ORAL 2 TIMES DAILY WITH MEALS
COMMUNITY
Start: 2022-02-20

## 2023-05-14 RX ORDER — CALCIUM CARBONATE/VITAMIN D3 600 MG-10
1 TABLET ORAL DAILY
COMMUNITY

## 2023-05-14 RX ORDER — ONDANSETRON 4 MG/1
4 TABLET, FILM COATED ORAL EVERY 8 HOURS PRN
COMMUNITY

## 2023-05-14 RX ORDER — IBUPROFEN 600 MG/1
600 TABLET ORAL 2 TIMES DAILY
COMMUNITY